# Patient Record
Sex: FEMALE | Race: WHITE | NOT HISPANIC OR LATINO | Employment: STUDENT | URBAN - METROPOLITAN AREA
[De-identification: names, ages, dates, MRNs, and addresses within clinical notes are randomized per-mention and may not be internally consistent; named-entity substitution may affect disease eponyms.]

---

## 2020-07-16 ENCOUNTER — TELEPHONE (OUTPATIENT)
Dept: PLASTIC SURGERY | Facility: CLINIC | Age: 20
End: 2020-07-16

## 2020-07-22 ENCOUNTER — TELEPHONE (OUTPATIENT)
Dept: PLASTIC SURGERY | Facility: CLINIC | Age: 20
End: 2020-07-22

## 2020-07-27 ENCOUNTER — TELEPHONE (OUTPATIENT)
Dept: PLASTIC SURGERY | Facility: CLINIC | Age: 20
End: 2020-07-27

## 2020-07-27 DIAGNOSIS — F64.0 GENDER DYSPHORIA IN ADOLESCENT AND ADULT: Primary | ICD-10-CM

## 2020-07-27 NOTE — TELEPHONE ENCOUNTER
[facility]:  Care Coordination Note     SITUATION   Ellis Hull is a 20 year old adult who is receiving support for:  Clinic Care Coordination - Initial  .    BACKGROUND     Pt is seeking top surgery    Pt currently works with [employer] on the [place] campus, however has not seen them since COVID    Pt has been on HRT since 2019  Pt is a non smoker and not diabetic    Pt was give Fax number for [facility] to get LOS to     ASSESSMENT     Surgery              CGC Assessment  Comprehensive [facility] Care ([facility]) Enrollment: Enrolled  Patient has a therapist: Yes  Name of therapist: Josias Cruz  Letter of support #1: Requested  Surgery being considered: Yes  Mastectomy: Yes  Mammogram completed: No          PLAN         Follow-up plan:  Pt to get the [facility] LOS    Pt to attend scheduled consult       River Marie

## 2020-11-27 ENCOUNTER — MEDICAL CORRESPONDENCE (OUTPATIENT)
Dept: HEALTH INFORMATION MANAGEMENT | Facility: CLINIC | Age: 20
End: 2020-11-27

## 2020-12-02 ENCOUNTER — TELEPHONE (OUTPATIENT)
Dept: PLASTIC SURGERY | Facility: CLINIC | Age: 20
End: 2020-12-02

## 2020-12-02 NOTE — TELEPHONE ENCOUNTER
12/2/2020 1:37 PM    Called patient at the contact number listed on file; no answer. Left voicemail. Provided contact info for patient to call back in order to convert 12/11 appointment to virtual. Sent MyC w/ chacha.    Phani Cuba NREMT

## 2020-12-06 ENCOUNTER — HEALTH MAINTENANCE LETTER (OUTPATIENT)
Age: 20
End: 2020-12-06

## 2020-12-10 ENCOUNTER — PATIENT OUTREACH (OUTPATIENT)
Dept: PLASTIC SURGERY | Facility: CLINIC | Age: 20
End: 2020-12-10

## 2020-12-10 NOTE — PROGRESS NOTES
[facility]:  Care Coordination Note     SITUATION   Patient (Ellis, he/him or they/them)  is a 20 year old who is receiving support for:  Clinic Care Coordination - Follow-up (Recieved Letter for top surgery)  .    BACKGROUND     Received adequate letter for case request and PA.     Pt has top consult with Dr. Hines on 12/11/20.     ASSESSMENT     Surgery              CGC Assessment  Comprehensive Gender Care ([facility]) Enrollment: Enrolled  Patient has a therapist: Yes  Name of therapist: Tavon Austin  Letter of support #1: Received  Letter #1 Date: 11/27/20          PLAN          Nursing Interventions:   Reviewed letter of support for WPATH standards of care which is adequate.     Follow-up plan: Pt to attend consult with Dr. Hines and complete other necessary treatment steps.        Darron Nunes

## 2020-12-11 ENCOUNTER — VIRTUAL VISIT (OUTPATIENT)
Dept: PLASTIC SURGERY | Facility: CLINIC | Age: 20
End: 2020-12-11
Attending: PLASTIC SURGERY
Payer: COMMERCIAL

## 2020-12-11 VITALS — WEIGHT: 240 LBS | HEIGHT: 64 IN | BODY MASS INDEX: 40.97 KG/M2

## 2020-12-11 DIAGNOSIS — F64.0 GENDER DYSPHORIA IN ADOLESCENT AND ADULT: Primary | ICD-10-CM

## 2020-12-11 PROBLEM — Z97.3 WEARS GLASSES: Status: ACTIVE | Noted: 2017-07-17

## 2020-12-11 PROBLEM — F60.3 BORDERLINE PERSONALITY DISORDER (H): Status: ACTIVE | Noted: 2020-12-11

## 2020-12-11 PROBLEM — F43.10 PTSD (POST-TRAUMATIC STRESS DISORDER): Status: ACTIVE | Noted: 2020-12-11

## 2020-12-11 PROBLEM — J45.31 MILD PERSISTENT ASTHMA WITH EXACERBATION: Status: ACTIVE | Noted: 2020-12-11

## 2020-12-11 PROBLEM — F32.1 MODERATE MAJOR DEPRESSION (H): Status: ACTIVE | Noted: 2020-12-11

## 2020-12-11 PROCEDURE — 99203 OFFICE O/P NEW LOW 30 MIN: CPT | Mod: 95 | Performed by: PLASTIC SURGERY

## 2020-12-11 RX ORDER — LORAZEPAM 0.5 MG/1
0.5 TABLET ORAL DAILY PRN
COMMUNITY

## 2020-12-11 RX ORDER — ALBUTEROL SULFATE 1.25 MG/3ML
1.25 SOLUTION RESPIRATORY (INHALATION)
COMMUNITY

## 2020-12-11 RX ORDER — FINASTERIDE 1 MG/1
TABLET, FILM COATED ORAL
COMMUNITY

## 2020-12-11 RX ORDER — LEVOTHYROXINE SODIUM 50 UG/1
50 TABLET ORAL
COMMUNITY
Start: 2019-06-14

## 2020-12-11 RX ORDER — LISDEXAMFETAMINE DIMESYLATE 50 MG/1
50 CAPSULE ORAL
COMMUNITY
Start: 2019-05-31

## 2020-12-11 RX ORDER — BUSPIRONE HYDROCHLORIDE 15 MG/1
TABLET ORAL
COMMUNITY
End: 2021-02-12

## 2020-12-11 ASSESSMENT — MIFFLIN-ST. JEOR: SCORE: 1833.63

## 2020-12-11 ASSESSMENT — PAIN SCALES - GENERAL: PAINLEVEL: NO PAIN (0)

## 2020-12-11 NOTE — NURSING NOTE
"Chief Complaint   Patient presents with     Consult     new top (mast); he/him or they/them       Vitals:    12/11/20 0957   Weight: 108.9 kg (240 lb)   Height: 1.626 m (5' 4\")       Body mass index is 41.2 kg/m .    Phani Cuba, EMT    "

## 2020-12-11 NOTE — PROGRESS NOTES
"Cyndee Hull is a 20 year old adult who is being evaluated via a billable video visit.      The patient has been notified of following:     \"This video visit will be conducted via a call between you and your physician/provider. We have found that certain health care needs can be provided without the need for an in-person physical exam.  This service lets us provide the care you need with a video conversation.  If a prescription is necessary we can send it directly to your pharmacy.  If lab work is needed we can place an order for that and you can then stop by our lab to have the test done at a later time.    Video visits are billed at different rates depending on your insurance coverage.  Please reach out to your insurance provider with any questions.    If during the course of the call the physician/provider feels a video visit is not appropriate, you will not be charged for this service.\"    Patient has given verbal consent for Video visit? Yes  How would you like to obtain your AVS? MyChart  If you are dropped from the video visit, the video invite should be resent to: Text to cell phone: [phone]  Will anyone else be joining your video visit? No        Video-Visit Details    Type of service:  Video Visit    Video Start Time: 1010a  Video End Time: 1026a    Originating Location (pt. Location): Home    Distant Location (provider location):  [facility] PLASTIC AND RECONSTRUCTIVE SURGERY CLINIC [place]     Platform used for Video Visit: [facility]    PLASTIC SURGERY HISTORY AND PHYSICAL    Chief Complaint: Gender dysphoria, requesting top surgery.     HPI: Patient is a 20 year old trans-person who prefers he/him/they/them pronouns, requesting top surgery.  Patient has been considering undergoing top surgery for the past 2.5 years.  The patient has history of chest binding occasionally for the past 2.5 years.  By undergoing top surgery, the patient would like to achieve congruence between the  physical " "body with chosen gender identity.  The patient transitioned 2 years ago.  Reports supportive family and friends.  Chosen name is Nay.  The patient has been on testosterone therapy for the past 2 years.  Denies any previous breast history. Denies tobacco smoking. Grandfather may have had bleeding disorder.     PMH:   Past Medical History:   Diagnosis Date     Epistaxis, hereditary        PSH:   No past surgical history on file.    FH:   No family history on file.     SH:   Social History     Tobacco Use     Smoking status: Never Smoker     Smokeless tobacco: Never Used   Substance Use Topics     Alcohol use: None     Drug use: None      Lives in [place], [place]. Moved there for [facility]. Living with 5 roommates and 5 cats. Grew up in [place].    MEDS:     Current Outpatient Medications:      albuterol (ACCUNEB) 1.25 MG/3ML neb solution, Inhale 1.25 mg into the lungs, Disp: , Rfl:      busPIRone (BUSPAR) 15 MG tablet, , Disp: , Rfl:      ferrous fumarate 65 mg, [place]. FE,-Vitamin C 125 mg (VITRON C)  MG TABS tablet, Take 1 tablet by mouth daily, Disp: , Rfl:      finasteride (PROPECIA) 1 MG tablet, , Disp: , Rfl:      levothyroxine (SYNTHROID/LEVOTHROID) 50 MCG tablet, Take 50 mcg by mouth, Disp: , Rfl:      lisdexamfetamine (VYVANSE) 50 MG capsule, Take 50 mg by mouth, Disp: , Rfl:      LORazepam (ATIVAN) 0.5 MG tablet, Take 0.5 mg by mouth daily as needed, Disp: , Rfl:      Multiple Vitamins-Minerals (MULTIVITAL PO), , Disp: , Rfl:      TESTOSTERONE CYPIONATE IM, Inject 40 mg into the muscle, Disp: , Rfl:      vortioxetine (TRINTELLIX) 20 MG tablet, Take 20 mg by mouth, Disp: , Rfl:        ALLERGIES:     Allergies   Allergen Reactions     Azithromycin Rash     Cefdinir Rash     Cefprozil Nausea and GI Disturbance        FH: None.     ROS: Negative except for HPI     PHYSICAL EXAMINATION:   Ht 1.626 m (5' 4\")   Wt 108.9 kg (240 lb)   BMI 41.20 kg/m     BMI: Body mass index is 41.2 kg/m .  Deferred " given virtual visit.     ASSESSMENT: Gender dysphoria, requesting top surgery.     PLAN: The patient is a potential candidate for bilateral simple complete mastectomy with free nipple graft reconstruction as a form of chest gender confirmation surgery. Patient has  provided us with a letter of support.  We will review this.  I am requesting a baseline screening mammogram.    I explained this outpatient procedure in detail today.  I explained the risks to include bleeding, infection, injury to surrounding structures, fluid collection, nipple or nipple graft loss, nipple sensory loss, change in nipple size, wound healing difficulties, contour deformity, dog ears, asymmetry, and need for revision surgery.  Patient accepts these risks and wishes to proceed with surgery.       Total time spent with patient was 30 min of which greater than 50% was in counseling.    Christie Hines MD  Plastic & Reconstructive Surgery  Pager: [phone]

## 2020-12-11 NOTE — LETTER
"12/11/2020       RE: Cyndee Hull  [address] [place] [address]     Dear Colleague,    Thank you for referring your patient, Cyndee Hull, to the [facility] PLASTIC AND RECONSTRUCTIVE SURGERY CLINIC [place] at [facility]. Please see a copy of my visit note below.    Cyndee Hull is a 20 year old adult who is being evaluated via a billable video visit.      The patient has been notified of following:     \"This video visit will be conducted via a call between you and your physician/provider. We have found that certain health care needs can be provided without the need for an in-person physical exam.  This service lets us provide the care you need with a video conversation.  If a prescription is necessary we can send it directly to your pharmacy.  If lab work is needed we can place an order for that and you can then stop by our lab to have the test done at a later time.    Video visits are billed at different rates depending on your insurance coverage.  Please reach out to your insurance provider with any questions.    If during the course of the call the physician/provider feels a video visit is not appropriate, you will not be charged for this service.\"    Patient has given verbal consent for Video visit? Yes  How would you like to obtain your AVS? MyChart  If you are dropped from the video visit, the video invite should be resent to: Text to cell phone: [phone]  Will anyone else be joining your video visit? No      Video-Visit Details    Type of service:  Video Visit    Video Start Time: 1010a  Video End Time: 1026a    Originating Location (pt. Location): Home    Distant Location (provider location):  [facility] PLASTIC AND RECON[facility]IVE SURGERY CLINIC [place]     Platform used for Video Visit: [employer]    PLASTIC SURGERY HISTORY AND PHYSICAL    Chief Complaint: Gender dysphoria, requesting top surgery.     HPI: Patient is a 20 year old " trans-person who prefers he/him/they/them pronouns, requesting top surgery.  Patient has been considering undergoing top surgery for the past 2.5 years.  The patient has history of chest binding occasionally for the past 2.5 years.  By undergoing top surgery, the patient would like to achieve congruence between the  physical body with chosen gender identity.  The patient transitioned 2 years ago.  Reports supportive family and friends.  Chosen name is Nay.  The patient has been on testosterone therapy for the past 2 years.  Denies any previous breast history. Denies tobacco smoking. Grandfather may have had bleeding disorder.     PMH:   Past Medical History:   Diagnosis Date     Epistaxis, hereditary        PSH:   No past surgical history on file.    FH:   No family history on file.     SH:   Social History     Tobacco Use     Smoking status: Never Smoker     Smokeless tobacco: Never Used   Substance Use Topics     Alcohol use: None     Drug use: None      Lives in [place], [place]. [facility]. Living with 5 roommates and 5 cats. Grew up in [place].    MEDS:     Current Outpatient Medications:      albuterol (ACCUNEB) 1.25 MG/3ML neb solution, Inhale 1.25 mg into the lungs, Disp: , Rfl:      busPIRone (BUSPAR) 15 MG tablet, , Disp: , Rfl:      ferrous fumarate 65 mg, [place]. FE,-Vitamin C 125 mg (VITRON C)  MG TABS tablet, Take 1 tablet by mouth daily, Disp: , Rfl:      finasteride (PROPECIA) 1 MG tablet, , Disp: , Rfl:      levothyroxine (SYNTHROID/LEVOTHROID) 50 MCG tablet, Take 50 mcg by mouth, Disp: , Rfl:      lisdexamfetamine (VYVANSE) 50 MG capsule, Take 50 mg by mouth, Disp: , Rfl:      LORazepam (ATIVAN) 0.5 MG tablet, Take 0.5 mg by mouth daily as needed, Disp: , Rfl:      Multiple Vitamins-Minerals (MULTIVITAL PO), , Disp: , Rfl:      TESTOSTERONE CYPIONATE IM, Inject 40 mg into the muscle, Disp: , Rfl:      vortioxetine (TRINTELLIX) 20 MG tablet, Take 20 mg by mouth, Disp: ,  "Rfl:        ALLERGIES:     Allergies   Allergen Reactions     Azithromycin Rash     Cefdinir Rash     Cefprozil Nausea and GI Disturbance        FH: None.     ROS: Negative except for HPI     PHYSICAL EXAMINATION:   Ht 1.626 m (5' 4\")   Wt 108.9 kg (240 lb)   BMI 41.20 kg/m     BMI: Body mass index is 41.2 kg/m .  Deferred given virtual visit.     ASSESSMENT: Gender dysphoria, requesting top surgery.     PLAN: The patient is a potential candidate for bilateral simple complete mastectomy with free nipple graft reconstruction as a form of chest gender confirmation surgery. Patient has  provided us with a letter of support.  We will review this.  I am requesting a baseline screening mammogram.    I explained this outpatient procedure in detail today.  I explained the risks to include bleeding, infection, injury to surrounding structures, fluid collection, nipple or nipple graft loss, nipple sensory loss, change in nipple size, wound healing difficulties, contour deformity, dog ears, asymmetry, and need for revision surgery.  Patient accepts these risks and wishes to proceed with surgery.       Total time spent with patient was 30 min of which greater than 50% was in counseling.    Christie Hines MD  Plastic & Reconstructive Surgery  Pager: [phone]        "

## 2020-12-21 ENCOUNTER — TELEPHONE (OUTPATIENT)
Dept: SURGERY | Facility: CLINIC | Age: 20
End: 2020-12-21

## 2020-12-28 ENCOUNTER — TRANSFERRED RECORDS (OUTPATIENT)
Dept: HEALTH INFORMATION MANAGEMENT | Facility: CLINIC | Age: 20
End: 2020-12-28

## 2020-12-28 LAB — NEGATIVE: NORMAL

## 2021-01-04 ENCOUNTER — PREP FOR PROCEDURE (OUTPATIENT)
Dept: PLASTIC SURGERY | Facility: CLINIC | Age: 21
End: 2021-01-04

## 2021-01-04 ENCOUNTER — TELEPHONE (OUTPATIENT)
Dept: SURGERY | Facility: CLINIC | Age: 21
End: 2021-01-04

## 2021-01-04 ENCOUNTER — PATIENT OUTREACH (OUTPATIENT)
Dept: PLASTIC SURGERY | Facility: CLINIC | Age: 21
End: 2021-01-04

## 2021-01-04 DIAGNOSIS — F64.0 GENDER DYSPHORIA IN ADOLESCENT AND ADULT: Primary | ICD-10-CM

## 2021-01-04 RX ORDER — CLINDAMYCIN PHOSPHATE 900 MG/50ML
900 INJECTION, SOLUTION INTRAVENOUS SEE ADMIN INSTRUCTIONS
Status: CANCELLED | OUTPATIENT
Start: 2021-01-04

## 2021-01-04 RX ORDER — CLINDAMYCIN PHOSPHATE 900 MG/50ML
900 INJECTION, SOLUTION INTRAVENOUS
Status: CANCELLED | OUTPATIENT
Start: 2021-01-04

## 2021-01-04 NOTE — TELEPHONE ENCOUNTER
received [facility] PA approval for bilateral mastectomy, #[id number], date span 12/21/20-6/18/21

## 2021-01-04 NOTE — PROGRESS NOTES
[facility]:  Care Coordination Note     SITUATION   Ellis Hull is a 20 year old adult who is receiving support for:  No chief complaint on file.  .    BACKGROUND     Pt attended new [facility] consult with Dr. Hines.    ASSESSMENT     Surgery              CGC Assessment  Comprehensive Gender Care ([facility]) Enrollment: Enrolled  Patient has a therapist: Yes  Name of therapist: Tavon Austin  Letter of support #1: Received  Letter #1 Date: 11/27/20  Surgery being considered: Yes  Mastectomy: Yes  Mammogram completed: Yes(12/28/20 Negative)          PLAN          Nursing Interventions: Mammogram completed 12/28/20 negative. LOS previously received and reviewed, adequate for PA.    Follow-up plan:  Ready to PA.       Carri Dooley RN

## 2021-01-05 PROBLEM — F64.0 GENDER DYSPHORIA IN ADOLESCENT AND ADULT: Status: ACTIVE | Noted: 2021-01-05

## 2021-02-01 DIAGNOSIS — Z11.59 ENCOUNTER FOR SCREENING FOR OTHER VIRAL DISEASES: Primary | ICD-10-CM

## 2021-02-12 ENCOUNTER — OFFICE VISIT (OUTPATIENT)
Dept: PLASTIC SURGERY | Facility: CLINIC | Age: 21
End: 2021-02-12
Payer: COMMERCIAL

## 2021-02-12 VITALS
BODY MASS INDEX: 41.32 KG/M2 | OXYGEN SATURATION: 96 % | HEIGHT: 64 IN | HEART RATE: 119 BPM | SYSTOLIC BLOOD PRESSURE: 151 MMHG | DIASTOLIC BLOOD PRESSURE: 102 MMHG | WEIGHT: 242 LBS

## 2021-02-12 DIAGNOSIS — F64.0 GENDER DYSPHORIA IN ADOLESCENT AND ADULT: Primary | ICD-10-CM

## 2021-02-12 DIAGNOSIS — Z11.59 ENCOUNTER FOR SCREENING FOR OTHER VIRAL DISEASES: ICD-10-CM

## 2021-02-12 LAB
LABORATORY COMMENT REPORT: NORMAL
SARS-COV-2 RNA RESP QL NAA+PROBE: NEGATIVE
SARS-COV-2 RNA RESP QL NAA+PROBE: NORMAL
SPECIMEN SOURCE: NORMAL
SPECIMEN SOURCE: NORMAL

## 2021-02-12 PROCEDURE — U0003 INFECTIOUS AGENT DETECTION BY NUCLEIC ACID (DNA OR RNA); SEVERE ACUTE RESPIRATORY SYNDROME CORONAVIRUS 2 (SARS-COV-2) (CORONAVIRUS DISEASE [COVID-19]), AMPLIFIED PROBE TECHNIQUE, MAKING USE OF HIGH THROUGHPUT TECHNOLOGIES AS DESCRIBED BY CMS-2020-01-R: HCPCS | Performed by: PATHOLOGY

## 2021-02-12 PROCEDURE — 99213 OFFICE O/P EST LOW 20 MIN: CPT | Performed by: PLASTIC SURGERY

## 2021-02-12 PROCEDURE — U0005 INFEC AGEN DETEC AMPLI PROBE: HCPCS | Performed by: PATHOLOGY

## 2021-02-12 RX ORDER — SYRINGE, DISPOSABLE, 1 ML
SYRINGE, EMPTY DISPOSABLE MISCELLANEOUS
COMMUNITY
Start: 2021-02-05

## 2021-02-12 RX ORDER — NEEDLES, DISPOSABLE 25GX5/8"
NEEDLE, DISPOSABLE MISCELLANEOUS
COMMUNITY
Start: 2021-02-05

## 2021-02-12 ASSESSMENT — MIFFLIN-ST. JEOR: SCORE: 1842.7

## 2021-02-12 ASSESSMENT — PAIN SCALES - GENERAL: PAINLEVEL: NO PAIN (0)

## 2021-02-12 NOTE — PROGRESS NOTES
"PLASTIC SURGERY OUTPATIENT NOTE     SUBJECTIVE  Anxious about COVID test. Also has some difficulty with mom accepting his gender identity and posting his surgical plans in public forum.    PHYSICAL EXAM  BP (!) 151/102 (BP Location: Left arm, Patient Position: Chair, Cuff Size: Adult Regular)   Pulse 119   Ht 1.626 m (5' 4\")   Wt 109.8 kg (242 lb)   SpO2 96%   BMI 41.54 kg/m     BMI: Body mass index is 41.54 kg/m .  General: No acute distress.    Chest examination was performed in the presence of a chaperone.    General chest shape: Flat    Pectoralis muscles intact bilaterally.    Bilateral lateral thoracic rolls are none.    Notch to nipple distance is 29 cm on the RIGHT and 29 cm on the LEFT.    Nipple to fold distance is 9 cm on the RIGHT and 9 cm on the LEFT.   Areole or diameter is 8.5 cm on the RIGHT and 8.5 cm on the LEFT.   Base width is 16 cm on the RIGHT and 16 cm on the LEFT.   Manubrium to current IMF distance: 20 cm  IMF to posterior iliac crest distance: 25 cm    ASSESSMENT/PLAN  Cyndee Hull is a 20 year old transgender gentleman with gender dysphoria currently presenting for preoperative evaluation in anticipation of bilateral mastectomy with free nipple grafts.  I explained this outpatient procedure in detail today.  I explained the risks to include bleeding, infection, injury to surrounding structures, fluid collection, nipple or nipple graft loss, nipple sensory loss, change in nipple size, wound healing difficulties, contour deformity, dog ears, asymmetry, and need for revision surgery.  Patient accepts these risks and wishes to proceed with surgery. Surgery is scheduled for 2/16/21.    Patient does not want nipples.    Total time spent with for this visit was 20 minutes, including review of documentation, counseling/discussion with patient, documentation, and organizing any potential follow-up.    Christie Hines MD  Pediatric Cleft and Craniofacial Surgery  Division of Plastic " Surgery  Pager: [phone]

## 2021-02-12 NOTE — LETTER
"2/12/2021       RE: Cyndee Hull  [address] [place] [address]     Dear Colleague,    Thank you for referring your patient, Cyndee Hull, to the [facility] PLASTIC AND RECONSTRUCTIVE SURGERY CLINIC [place] at [facility]. Please see a copy of my visit note below.    PLASTIC SURGERY OUTPATIENT NOTE     SUBJECTIVE  Anxious about COVID test. Also has some difficulty with mom accepting his gender identity and posting his surgical plans in public forum.    PHYSICAL EXAM  BP (!) 151/102 (BP Location: Left arm, Patient Position: Chair, Cuff Size: Adult Regular)   Pulse 119   Ht 1.626 m (5' 4\")   Wt 109.8 kg (242 lb)   SpO2 96%   BMI 41.54 kg/m     BMI: Body mass index is 41.54 kg/m .  General: No acute distress.    Chest examination was performed in the presence of a chaperone.    General chest shape: Flat    Pectoralis muscles intact bilaterally.    Bilateral lateral thoracic rolls are none.    Notch to nipple distance is 29 cm on the RIGHT and 29 cm on the LEFT.    Nipple to fold distance is 9 cm on the RIGHT and 9 cm on the LEFT.   Areole or diameter is 8.5 cm on the RIGHT and 8.5 cm on the LEFT.   Base width is 16 cm on the RIGHT and 16 cm on the LEFT.   Manubrium to current IMF distance: 20 cm  IMF to posterior iliac crest distance: 25 cm    ASSESSMENT/PLAN  Cyndee Hull is a 20 year old transgender gentleman with gender dysphoria currently presenting for preoperative evaluation in anticipation of bilateral mastectomy with free nipple grafts.  I explained this outpatient procedure in detail today.  I explained the risks to include bleeding, infection, injury to surrounding structures, fluid collection, nipple or nipple graft loss, nipple sensory loss, change in nipple size, wound healing difficulties, contour deformity, dog ears, asymmetry, and need for revision surgery.  Patient accepts these risks and wishes to proceed with surgery. " Surgery is scheduled for 2/16/21.    Patient does not want nipples.    Total time spent with for this visit was 20 minutes, including review of documentation, counseling/discussion with patient, documentation, and organizing any potential follow-up.    Christie Hines MD  Pediatric Cleft and Craniofacial Surgery  Division of Plastic Surgery  Pager: [phone]

## 2021-02-12 NOTE — NURSING NOTE
"Chief Complaint   Patient presents with     RECHECK     pre op anna mast w/ nips DOS 2/16       Vitals:    02/12/21 1238   BP: (!) 151/102   BP Location: Left arm   Patient Position: Chair   Cuff Size: Adult Regular   Pulse: 119   SpO2: 96%   Weight: 109.8 kg (242 lb)   Height: 1.626 m (5' 4\")       Body mass index is 41.54 kg/m .    Phani Cuba, EMT    "

## 2021-02-15 ENCOUNTER — ANESTHESIA EVENT (OUTPATIENT)
Dept: SURGERY | Facility: AMBULATORY SURGERY CENTER | Age: 21
End: 2021-02-15

## 2021-02-15 RX ORDER — FENTANYL CITRATE 50 UG/ML
25-50 INJECTION, SOLUTION INTRAMUSCULAR; INTRAVENOUS
Status: CANCELLED | OUTPATIENT
Start: 2021-02-15

## 2021-02-15 RX ORDER — NALOXONE HYDROCHLORIDE 0.4 MG/ML
0.4 INJECTION, SOLUTION INTRAMUSCULAR; INTRAVENOUS; SUBCUTANEOUS
Status: DISCONTINUED | OUTPATIENT
Start: 2021-02-15 | End: 2021-02-17 | Stop reason: HOSPADM

## 2021-02-15 RX ORDER — NALOXONE HYDROCHLORIDE 0.4 MG/ML
0.2 INJECTION, SOLUTION INTRAMUSCULAR; INTRAVENOUS; SUBCUTANEOUS
Status: DISCONTINUED | OUTPATIENT
Start: 2021-02-15 | End: 2021-02-17 | Stop reason: HOSPADM

## 2021-02-16 ENCOUNTER — HOSPITAL ENCOUNTER (OUTPATIENT)
Facility: AMBULATORY SURGERY CENTER | Age: 21
Discharge: HOME | End: 2021-02-16
Attending: PLASTIC SURGERY | Admitting: PLASTIC SURGERY
Payer: COMMERCIAL

## 2021-02-16 ENCOUNTER — ANESTHESIA (OUTPATIENT)
Dept: SURGERY | Facility: AMBULATORY SURGERY CENTER | Age: 21
End: 2021-02-16

## 2021-02-16 VITALS
HEIGHT: 65 IN | SYSTOLIC BLOOD PRESSURE: 122 MMHG | BODY MASS INDEX: 40.32 KG/M2 | TEMPERATURE: 97.9 F | RESPIRATION RATE: 16 BRPM | DIASTOLIC BLOOD PRESSURE: 72 MMHG | OXYGEN SATURATION: 100 % | WEIGHT: 242 LBS | HEART RATE: 75 BPM

## 2021-02-16 DIAGNOSIS — F64.0 GENDER DYSPHORIA IN ADOLESCENT AND ADULT: Primary | ICD-10-CM

## 2021-02-16 LAB
HCG UR QL: NEGATIVE
INTERNAL QC OK POCT: YES

## 2021-02-16 PROCEDURE — 19303 MAST SIMPLE COMPLETE: CPT | Mod: 50

## 2021-02-16 PROCEDURE — 81025 URINE PREGNANCY TEST: CPT | Performed by: ANESTHESIOLOGY

## 2021-02-16 PROCEDURE — 88305 TISSUE EXAM BY PATHOLOGIST: CPT | Mod: 26 | Performed by: PATHOLOGY

## 2021-02-16 RX ORDER — ALBUTEROL SULFATE 0.83 MG/ML
2.5 SOLUTION RESPIRATORY (INHALATION) EVERY 4 HOURS PRN
Status: DISCONTINUED | OUTPATIENT
Start: 2021-02-16 | End: 2021-02-17 | Stop reason: HOSPADM

## 2021-02-16 RX ORDER — OXYCODONE HYDROCHLORIDE 5 MG/1
5-10 TABLET ORAL EVERY 4 HOURS PRN
Status: DISCONTINUED | OUTPATIENT
Start: 2021-02-16 | End: 2021-02-17 | Stop reason: HOSPADM

## 2021-02-16 RX ORDER — FENTANYL CITRATE 50 UG/ML
INJECTION, SOLUTION INTRAMUSCULAR; INTRAVENOUS PRN
Status: DISCONTINUED | OUTPATIENT
Start: 2021-02-16 | End: 2021-02-16

## 2021-02-16 RX ORDER — NALOXONE HYDROCHLORIDE 0.4 MG/ML
0.2 INJECTION, SOLUTION INTRAMUSCULAR; INTRAVENOUS; SUBCUTANEOUS
Status: DISCONTINUED | OUTPATIENT
Start: 2021-02-16 | End: 2021-02-17 | Stop reason: HOSPADM

## 2021-02-16 RX ORDER — BUPIVACAINE HYDROCHLORIDE 2.5 MG/ML
INJECTION, SOLUTION EPIDURAL; INFILTRATION; INTRACAUDAL PRN
Status: DISCONTINUED | OUTPATIENT
Start: 2021-02-16 | End: 2021-02-16

## 2021-02-16 RX ORDER — DEXAMETHASONE SODIUM PHOSPHATE 10 MG/ML
4 INJECTION, SOLUTION INTRAMUSCULAR; INTRAVENOUS EVERY 10 MIN PRN
Status: DISCONTINUED | OUTPATIENT
Start: 2021-02-16 | End: 2021-02-17 | Stop reason: HOSPADM

## 2021-02-16 RX ORDER — DEXAMETHASONE SODIUM PHOSPHATE 4 MG/ML
INJECTION, SOLUTION INTRA-ARTICULAR; INTRALESIONAL; INTRAMUSCULAR; INTRAVENOUS; SOFT TISSUE PRN
Status: DISCONTINUED | OUTPATIENT
Start: 2021-02-16 | End: 2021-02-16

## 2021-02-16 RX ORDER — CLINDAMYCIN PHOSPHATE 900 MG/50ML
900 INJECTION, SOLUTION INTRAVENOUS
Status: COMPLETED | OUTPATIENT
Start: 2021-02-16 | End: 2021-02-16

## 2021-02-16 RX ORDER — OXYCODONE HYDROCHLORIDE 5 MG/1
5 TABLET ORAL EVERY 6 HOURS PRN
Qty: 6 TABLET | Refills: 0 | Status: SHIPPED | OUTPATIENT
Start: 2021-02-16 | End: 2021-02-19

## 2021-02-16 RX ORDER — KETOROLAC TROMETHAMINE 30 MG/ML
30 INJECTION, SOLUTION INTRAMUSCULAR; INTRAVENOUS EVERY 6 HOURS PRN
Status: DISCONTINUED | OUTPATIENT
Start: 2021-02-16 | End: 2021-02-17 | Stop reason: HOSPADM

## 2021-02-16 RX ORDER — HYDROMORPHONE HYDROCHLORIDE 1 MG/ML
.3-.5 INJECTION, SOLUTION INTRAMUSCULAR; INTRAVENOUS; SUBCUTANEOUS EVERY 10 MIN PRN
Status: DISCONTINUED | OUTPATIENT
Start: 2021-02-16 | End: 2021-02-17 | Stop reason: HOSPADM

## 2021-02-16 RX ORDER — CLINDAMYCIN PHOSPHATE 900 MG/50ML
900 INJECTION, SOLUTION INTRAVENOUS SEE ADMIN INSTRUCTIONS
Status: DISCONTINUED | OUTPATIENT
Start: 2021-02-16 | End: 2021-02-17 | Stop reason: HOSPADM

## 2021-02-16 RX ORDER — SODIUM CHLORIDE, SODIUM LACTATE, POTASSIUM CHLORIDE, CALCIUM CHLORIDE 600; 310; 30; 20 MG/100ML; MG/100ML; MG/100ML; MG/100ML
INJECTION, SOLUTION INTRAVENOUS CONTINUOUS
Status: DISCONTINUED | OUTPATIENT
Start: 2021-02-16 | End: 2021-02-17 | Stop reason: HOSPADM

## 2021-02-16 RX ORDER — LABETALOL HYDROCHLORIDE 5 MG/ML
10 INJECTION, SOLUTION INTRAVENOUS
Status: DISCONTINUED | OUTPATIENT
Start: 2021-02-16 | End: 2021-02-17 | Stop reason: HOSPADM

## 2021-02-16 RX ORDER — ONDANSETRON 4 MG/1
4 TABLET, ORALLY DISINTEGRATING ORAL EVERY 30 MIN PRN
Status: DISCONTINUED | OUTPATIENT
Start: 2021-02-16 | End: 2021-02-17 | Stop reason: HOSPADM

## 2021-02-16 RX ORDER — NALOXONE HYDROCHLORIDE 0.4 MG/ML
0.4 INJECTION, SOLUTION INTRAMUSCULAR; INTRAVENOUS; SUBCUTANEOUS
Status: DISCONTINUED | OUTPATIENT
Start: 2021-02-16 | End: 2021-02-17 | Stop reason: HOSPADM

## 2021-02-16 RX ORDER — ACETAMINOPHEN 325 MG/1
975 TABLET ORAL ONCE
Status: COMPLETED | OUTPATIENT
Start: 2021-02-16 | End: 2021-02-16

## 2021-02-16 RX ORDER — FENTANYL CITRATE 50 UG/ML
25-50 INJECTION, SOLUTION INTRAMUSCULAR; INTRAVENOUS
Status: DISCONTINUED | OUTPATIENT
Start: 2021-02-16 | End: 2021-02-17 | Stop reason: HOSPADM

## 2021-02-16 RX ORDER — MEPERIDINE HYDROCHLORIDE 25 MG/ML
12.5 INJECTION INTRAMUSCULAR; INTRAVENOUS; SUBCUTANEOUS
Status: DISCONTINUED | OUTPATIENT
Start: 2021-02-16 | End: 2021-02-17 | Stop reason: HOSPADM

## 2021-02-16 RX ORDER — PROPOFOL 10 MG/ML
INJECTION, EMULSION INTRAVENOUS CONTINUOUS PRN
Status: DISCONTINUED | OUTPATIENT
Start: 2021-02-16 | End: 2021-02-16

## 2021-02-16 RX ORDER — FLUMAZENIL 0.1 MG/ML
0.2 INJECTION, SOLUTION INTRAVENOUS
Status: DISCONTINUED | OUTPATIENT
Start: 2021-02-16 | End: 2021-02-17 | Stop reason: HOSPADM

## 2021-02-16 RX ORDER — PROPOFOL 10 MG/ML
INJECTION, EMULSION INTRAVENOUS PRN
Status: DISCONTINUED | OUTPATIENT
Start: 2021-02-16 | End: 2021-02-16

## 2021-02-16 RX ORDER — KETAMINE HYDROCHLORIDE 10 MG/ML
INJECTION INTRAMUSCULAR; INTRAVENOUS PRN
Status: DISCONTINUED | OUTPATIENT
Start: 2021-02-16 | End: 2021-02-16

## 2021-02-16 RX ORDER — LIDOCAINE HYDROCHLORIDE 20 MG/ML
INJECTION, SOLUTION INFILTRATION; PERINEURAL PRN
Status: DISCONTINUED | OUTPATIENT
Start: 2021-02-16 | End: 2021-02-16

## 2021-02-16 RX ORDER — LIDOCAINE 40 MG/G
CREAM TOPICAL
Status: DISCONTINUED | OUTPATIENT
Start: 2021-02-16 | End: 2021-02-17 | Stop reason: HOSPADM

## 2021-02-16 RX ORDER — ONDANSETRON 2 MG/ML
4 INJECTION INTRAMUSCULAR; INTRAVENOUS EVERY 30 MIN PRN
Status: DISCONTINUED | OUTPATIENT
Start: 2021-02-16 | End: 2021-02-17 | Stop reason: HOSPADM

## 2021-02-16 RX ORDER — ONDANSETRON 2 MG/ML
INJECTION INTRAMUSCULAR; INTRAVENOUS PRN
Status: DISCONTINUED | OUTPATIENT
Start: 2021-02-16 | End: 2021-02-16

## 2021-02-16 RX ORDER — GABAPENTIN 300 MG/1
300 CAPSULE ORAL ONCE
Status: COMPLETED | OUTPATIENT
Start: 2021-02-16 | End: 2021-02-16

## 2021-02-16 RX ADMIN — KETAMINE HYDROCHLORIDE 50 MG: 10 INJECTION INTRAMUSCULAR; INTRAVENOUS at 08:04

## 2021-02-16 RX ADMIN — GABAPENTIN 300 MG: 300 CAPSULE ORAL at 07:29

## 2021-02-16 RX ADMIN — SODIUM CHLORIDE, SODIUM LACTATE, POTASSIUM CHLORIDE, CALCIUM CHLORIDE: 600; 310; 30; 20 INJECTION, SOLUTION INTRAVENOUS at 07:31

## 2021-02-16 RX ADMIN — PROPOFOL 200 MCG/KG/MIN: 10 INJECTION, EMULSION INTRAVENOUS at 08:08

## 2021-02-16 RX ADMIN — DEXAMETHASONE SODIUM PHOSPHATE 4 MG: 4 INJECTION, SOLUTION INTRA-ARTICULAR; INTRALESIONAL; INTRAMUSCULAR; INTRAVENOUS; SOFT TISSUE at 08:09

## 2021-02-16 RX ADMIN — PROPOFOL: 10 INJECTION, EMULSION INTRAVENOUS at 08:58

## 2021-02-16 RX ADMIN — OXYCODONE HYDROCHLORIDE 5 MG: 5 TABLET ORAL at 10:33

## 2021-02-16 RX ADMIN — ACETAMINOPHEN 975 MG: 325 TABLET ORAL at 07:29

## 2021-02-16 RX ADMIN — ONDANSETRON 4 MG: 2 INJECTION INTRAMUSCULAR; INTRAVENOUS at 09:29

## 2021-02-16 RX ADMIN — FENTANYL CITRATE 100 MCG: 50 INJECTION, SOLUTION INTRAMUSCULAR; INTRAVENOUS at 08:25

## 2021-02-16 RX ADMIN — BUPIVACAINE HYDROCHLORIDE 40 ML: 2.5 INJECTION, SOLUTION EPIDURAL; INFILTRATION; INTRACAUDAL at 07:45

## 2021-02-16 RX ADMIN — FENTANYL CITRATE 50 MCG: 50 INJECTION, SOLUTION INTRAMUSCULAR; INTRAVENOUS at 07:41

## 2021-02-16 RX ADMIN — CLINDAMYCIN PHOSPHATE 900 MG: 900 INJECTION, SOLUTION INTRAVENOUS at 08:08

## 2021-02-16 RX ADMIN — PROPOFOL 200 MG: 10 INJECTION, EMULSION INTRAVENOUS at 08:04

## 2021-02-16 RX ADMIN — LIDOCAINE HYDROCHLORIDE 100 MG: 20 INJECTION, SOLUTION INFILTRATION; PERINEURAL at 08:04

## 2021-02-16 ASSESSMENT — MIFFLIN-ST. JEOR: SCORE: 1855.4

## 2021-02-16 NOTE — OR NURSING
Patient received bilateral Pectoralis nerve block  with Exparel.  Fentanyl 50mcg and Versed 1mg given. Tolerated procedure well.     Opal Limon RN

## 2021-02-16 NOTE — ANESTHESIA PREPROCEDURE EVALUATION
Anesthesia Pre-Procedure Evaluation    Patient: Cyndee Hull   MRN: [id number] : 2000        Preoperative Diagnosis: Gender dysphoria in adolescent and adult [F64.0]   Procedure : Procedure(s):  Bilateral mastectomy     Past Medical History:   Diagnosis Date     Epistaxis, hereditary      Uncomplicated asthma       History reviewed. No pertinent surgical history.   Allergies   Allergen Reactions     Azithromycin Rash     Cefdinir Rash     Cefprozil Nausea and GI Disturbance      Social History     Tobacco Use     Smoking status: Never Smoker     Smokeless tobacco: Never Used   Substance Use Topics     Alcohol use: Not on file      Wt Readings from Last 1 Encounters:   21 109.8 kg (242 lb)        Anesthesia Evaluation   Pt has had prior anesthetic.     No history of anesthetic complications       ROS/MED HX  ENT/Pulmonary: Comment: Epistaxis    (+) Intermittent, asthma     Neurologic:       Cardiovascular:  - neg cardiovascular ROS     METS/Exercise Tolerance:     Hematologic:  - neg hematologic  ROS     Musculoskeletal:  - neg musculoskeletal ROS     GI/Hepatic:  - neg GI/hepatic ROS  (-) GERD   Renal/Genitourinary:  - neg Renal ROS     Endo:     (+) thyroid problem, Obesity,     Psychiatric/Substance Use:     (+) psychiatric history anxiety (PTSD) Recreational drug usage: Cannabis.    Infectious Disease:  - neg infectious disease ROS     Malignancy:       Other:            Physical Exam    Airway  airway exam normal      Mallampati: I   TM distance: > 3 FB   Neck ROM: full   Mouth opening: > 3 cm    Respiratory Devices and Support         Dental  no notable dental history         Cardiovascular   cardiovascular exam normal          Pulmonary   pulmonary exam normal                OUTSIDE LABS:  CBC: No results found for: WBC, HGB, HCT, PLT  BMP: No results found for: NA, POTASSIUM, CHLORIDE, CO2, BUN, CR, GLC  COAGS: No results found for: PTT, INR, FIBR  POC:   Lab Results   Component Value Date     HCG Negative 02/16/2021     HEPATIC: No results found for: ALBUMIN, PROTTOTAL, ALT, AST, GGT, ALKPHOS, BILITOTAL, BILIDIRECT, LEONORA  OTHER: No results found for: PH, LACT, A1C, ALANA, PHOS, MAG, LIPASE, AMYLASE, TSH, T4, T3, CRP, SED    Anesthesia Plan    ASA Status:  3   NPO Status:  NPO Appropriate    Anesthesia Type: General.     - Airway: LMA   Induction: Intravenous, Propofol.   Maintenance: Balanced.        Consents    Anesthesia Plan(s) and associated risks, benefits, and realistic alternatives discussed. Questions answered and patient/representative(s) expressed understanding.     - Discussed with:  Patient      - Extended Intubation/Ventilatory Support Discussed: no Extended Intubation.      - Patient is DNR/DNI Status: No    Use of blood products discussed: No .     Postoperative Care    Pain management: Peripheral nerve block (Single Shot), IV analgesics, Oral pain medications.   PONV prophylaxis: Ondansetron (or other 5HT-3), Dexamethasone or Solumedrol, Background Propofol Infusion     Comments:    Bilateral pectoralis block with Exaprel            Amrit Monge MD

## 2021-02-16 NOTE — ANESTHESIA CARE TRANSFER NOTE
Patient: Cyndee Hull    Procedure(s):  Bilateral mastectomy    Diagnosis: Gender dysphoria in adolescent and adult [F64.0]  Diagnosis Additional Information: No value filed.    Anesthesia Type:   No value filed.     Note:    Oropharynx: oropharynx clear of all foreign objects  Level of Consciousness: awake  Oxygen Supplementation: face mask    Independent Airway: airway patency satisfactory and stable  Dentition: dentition unchanged      Patient transferred to: PACU    Handoff Report: Identifed the Patient, Identified the Reponsible Provider, Reviewed the pertinent medical history, Discussed the surgical course, Reviewed Intra-OP anesthesia mangement and issues during anesthesia, Set expectations for post-procedure period and Allowed opportunity for questions and acknowledgement of understanding      Vitals: (Last set prior to Anesthesia Care Transfer)  CRNA VITALS  2/16/2021 0923 - 2/16/2021 1002      2/16/2021             Pulse:  95    SpO2:  93 %    Resp Rate (set):  10        Electronically Signed By: EFRA Rivera CRNA  February 16, 2021  10:02 AM

## 2021-02-16 NOTE — OP NOTE
PLASTIC SURGERY OPERATIVE NOTE    DATE OF OPERATION: February 16, 2021    PREOPERATIVE DIAGNOSIS: Gender dysphoria.    POSTOPERATIVE DIAGNOSIS: Gender dysphoria.    PROCEDURES PERFORMED:   1.  Bilateral simple complete mastectomy.    SURGEON: Christie Hines MD    ASSISTANT: Teddy Plascencia MD, PGY-5    ANESTHESIA: General.    ESTIMATED BLOOD LOSS: 50 mL.    COMPLICATIONS: None.    SPECIMENS: Bilateral breast tissue. Right breast tissue weight 817 g. Left breast tissue weight 640 g.    DRAINS: Two 15 Macedonian drains, one in each chest.    IMPLANTS: None.    INDICATIONS: Patient is a 20 year old transgender gentleman who prefers he/him/they/them pronouns. The patient was evaluated in the plastic surgery clinic for potential top surgery to address gender dysphoria. Prior to authorization for surgery, the patient received a letter of support. Patient transitioned 2 years ago. A screening mammogram was obtained which demonstrated no evidence of breast malignancy. Patient met WPATH guidelines for top surgery. Risks were discussed regarding bilateral mastectomy with free nipple graft reconstruction as a form of chest gender confirmation surgery. Patient accepted the associated risks and wished to proceed with surgery.    DESCRIPTION OF PROCEDURE: The patient presented to the preoperative holding area on February 16, 2021. The patient was seen by myself and anesthesia and the identification, site, and procedure were confirmed per hospital protocol. Informed consent was obtained. The chest was then marked. Patient was then taken to the operating room and placed in a supine position. Preoperative antibiotics were given, bilateral lower leg SCD's were placed, and general anesthesia was obtained. All pressure points were padded and arms placed on arm boards. The chest was then prepped with Chloraprep solution and draped in a sterile fashion. A time out was performed.    We started on the right side. Tumescent solution was infiltrated into  the subcutaneous plane for vasoconstriction.  The proposed inframammary incisions were made with a scalpel and carried down into the subcutaneous tissue with bovie cautery. Pectoralis fascia was identified. A supra-fascial dissection commenced. Then through the superior incision, a supra-parenchymal plane was developed. Dissection was carried medially to the sternum, superiorly to the clavicle, laterally to the axilla and lateral border of pectoralis, and inferiorly to the inframammary fold.     The breast tissue was then excised and weighed. The pocket was washed and hemostasis was achieved. The incision was closed in layers in such a fashion as to minimize medial and lateral dog-ears. This was completed on the contralateral side. Prior to definitive closure, a 15 Russian round channel drains was introduced into each wound and exited laterally close to the axillary hair. These drains were secured with 3-0 Nylon stitch. Contour was found to be flat and symmetric.     ABD pad was applied to the drain site, followed by fluffly kerlix wrap, and completed with a light ACE compression wrap. Patient was then extubated, awakened, and transferred to the postoperative area in stable condition. All sponge, needle, and instrument counts were accurate at the end of the case. I was present and scrubbed for the entire procedure.

## 2021-02-16 NOTE — ANESTHESIA PROCEDURE NOTES
Pre-Procedure   Staff -   Anesthesiologist:  Armit Monge MD  Resident/Fellow: Maykel Shaffer MD  Performed By: anesthesiologist and with fellow  Location: pre-op  Pre-Anesthestic Checklist: patient identified, IV checked, site marked, risks and benefits discussed, informed consent, monitors and equipment checked, pre-op evaluation, at physician/surgeon's request and post-op pain management  Timeout:  Correct Patient: Yes   Correct Procedure: Yes   Correct Site: Yes   Correct Position: Yes   Correct Laterality: Yes   Site Marked: Yes    Procedure Documentation  Procedure: Pectoralis  Diagnosis: POST OPERATIVE PAIN  Laterality: bilateral  Patient Position:supine  Patient Prep/Sterile Barriers: sterile gloves, mask, Chloraprep  Needle type: short bevel  Needle Gauge: 21.   Needle Length (millimeters) 110   Ultrasound guided, Ultrasound used to identify targeted nerve, plexus, vascular marker, or fascial plane and place a needle adjacent to it in real-time, Ultrasound was used to visualize the spread of anesthetic in close proximity to the above referenced structure  A permanent image is entered into the patient's record.  There were no apparent abnormal pathologic findings    Assessment/Narrative      The placement was negative for: blood aspirated, painful injection and site bleeding  Paresthesias: No.  Bolus given via needle..   Secured via.   Insertion/Infusion Method: Single Shot  Complications: none  Injection made incrementally with aspirations every 5 mL.  Comments:  266 mg Exparel used in bilateral pectoralis 2 plane block

## 2021-02-16 NOTE — DISCHARGE INSTRUCTIONS
PLASTIC SURGERY INSTRUCTIONS  Bilateral Mastectomy with Nipple Grafts    Instructions    Have someone drive you home after surgery and help you at home for 1-2 days.    Get plenty of rest and follow balanced diet.      Decreased activity may promote constipation, so you may want to add more raw fruit to your diet    Be sure to increase fluid intake.     Do not take aspirin or any products containing aspirin unless approved by your surgeon.    Do not drink alcohol when taking pain medications.    Pain medications:  o Once you get home, take Acetaminophen 650mg every 6 hours, even if your pain is mild, to stay on top of the pain.  o By the second day, if the pain is still bothering you, add Ibuprofen 500mg every 6 hours.   o Stagger acetaminophen and ibuprofen so that you are taking either pain medicine every 3 hours.  o For example:   - 8AM: Acetaminophen  - 11AM: Ibuprofen  - 2PM: Acetaminophen  - 5PM: Ibuprofen   o Use the narcotic pain medication ONLY AS NEEDED in case of emergency after other medications have been tried  o Expect your pain to increase by the 2nd or 3rd day since your chest block injections will have worn off by then    Even when not taking pain medications, do not consume alcohol for 3 weeks as it causes fluid retention.    If you are taking vitamins with iron, resume these as tolerated.    Do not smoke, as smoking delays healing and increases the risk of complications.    Activities    Start walking as soon as possible, this helps to reduce swelling and lowers the chance of blood clots.    Do not drive until you are no longer taking any pain medications (narcotics).    No lifting more than a gallon of milk (5 lbs) for 4 weeks    Do not drive until you have full range of motion with your arms.    Refrain from vigorous activity for 4 weeks    Restrict excessive use of arms for at least 4 weeks.     Keep arms in front of you or down to the side (like a T-Dank)    Do NOT reach for anything about your  head, behind you, or off to the side for 4 weeks    Refrain from physical contact with chest for 4 weeks.      Body contact sports should be avoided for 6-8 weeks.    Social and employment activities can be resumed in 3-10 days.    Incision Care    Do NOT apply ice or heat to your chest. This can compromise your wound healing.    You may shower 48 hours after your drainage tubes have been removed    Drain care:  o Monitor and record your drain output daily. This well help us determine if it is safe to remove at your postoperative visit.  o Milk drain tubing 3-4 times a day to prevent clogging. It helps to use something wet to slide along the tubing, like an alcohol wipe.  o Your drain is sutured to your skin. Do NOT tug on the drain.  o Each drain output needs to be less than 30mL per day for 2 consecutive days prior to removal to decrease risk of fluid accumulation.  o Drains can be removed prior to your first scheduled visit if they meet this criterion.    Wrap Care:  o Keep your wrap on continuously until your first postoperative clinic visit  o You may undo and re-apply your wrap if it is too tight, too loose  o You may also undo and re-apply your wrap if you need to look at your chest for concerns of bleeding or infection  - Signs of bleeding include sudden swelling, pain, craig bruising, increased red bloody drain output  - Signs of infection include fevers, chills, sweats, increasing pain, redness     Please have someone help you with wrap and drain care. Do NOT do this on your own since your arm range of motion should be limited    After showering, pat your incisions dry.    Continue torso wrap for at least 1 month after surgery.    Keep incisions clean and inspect daily for signs of infection.    No tub soaking, bathing, or swimming for 4 weeks.    You may pad the incisions with gauze for comfort.    Refrain from sleeping on your stomach     Always use a strong sunblock, if sun exposure is unavoidable (SPF  50 or greater).    Continue to apply surgical tape to the incision for 3 months. If the tape starts peeling off, clean the incision and apply a new layer of tape. This will help minimize scarring.    We will teach you how to tape your wound and provide tape at your first postoperative appointment    What to Expect    Expect some drainage onto the surgical tape covering the incisions.    You are likely to feel tired for a few days, but you should be up and around in 4-5 days.    Maximum discomfort and swelling will occur in the first few days after surgery.    You may experience temporary soreness, tightness, swelling and bruising as well as some discomfort in the incision area.      Appearance    Most of the discoloration and swelling will subside in 4-6 weeks.    Scars may be red and angry looking for 6 months. In time, these usually soften and fade.    Your chest shape will change up to 1 year after surgery    Please note my office will call you 1-2 business days after your procedure to check up on how you're doing and to schedule your post-operative appointment.     When to Call:    If you have increased swelling or bruising.    If swelling and redness persist for a few days.    If you have increased redness along the incision.  If you have severe or increased pain not relieved by medication.    If you have any side effects to medications; such as, rash, nausea, headache, vomiting or constipation.    If you have an oral temperature over 100.4 degrees.    If you have any yellowish or greenish drainage from the incisions or notice a foul odor.    If you have bleeding from the incisions that is difficult to control with light pressure.    If you develop increased pain in your calves, shortness of breath, or chest pain.    If you develop any symptoms of concern.     For Medical Questions, Please Call:    [phone],  Monday - Friday, 8 a.m. - 4:30 p.m.    After hours and on weekends, call Hospital Paging at  [phone] and ask for the Plastic Surgeon on call.    If you need to go to the ER, please go to [facility]  Home Care Following Anesthesia  For 24 hours after surgery:  1. Get plenty of rest.  A responsible adult must stay with you for at least 24 hours after you leave the surgery center.  2. Do not drive or use heavy equipment.  If you have weakness or tingling, don't drive or use heavy equipment until this feeling goes away.   3. Do not drink alcohol.   4. Avoid strenuous or risky activities.  Ask for help when climbing stairs.  5. You may feel lightheaded.  IF so, sit for a few minutes before standing.  Have someone help you get up.   6. If you have nausea (feel sick to your stomach): Drink only clear liquids such as apple juice, ginger ale, broth or 7-Up.  Rest may also help.  Be sure to drink enough fluids.  Move to a regular diet as you feel able.   7. You may have a slight fever.  Call the doctor if your fever is over 100 F (37.7 C) (taken under the tongue) or lasts longer than 24 hours.  8. You may have a dry mouth, a sore throat, muscle aches or trouble sleeping. These should go away after 24 hours.  9. Do not make important or legal decisions.               Tips for taking pain medications  To get the best pain relief possible, remember these points:    Take pain medications as directed, before pain becomes severe.    Pain medication can upset your stomach: taking it with food may help.    Constipation is a common side effect of pain medication. Drink plenty of  fluids.    Eat foods high in fiber. Take a stool softener if recommended by your doctor or pharmacist.    Do not drink alcohol, drive or operate machinery while taking pain medications.    Ask about other ways to control pain, such as with heat, ice or relaxation.    Tylenol/Acetaminophen Consumption  To help encourage the safe use of acetaminophen, the makers of  TYLENOL  have lowered the maximum daily dose for single-ingredient Extra Strength TYLENOL  (acetaminophen) products sold in the [place] from 8 pills per day (4,000 mg) to 6 pills per day (3,000 mg). The dosing interval has also changed from 2 pills every 4-6 hours to 2 pills every 6 hours.    If you feel your pain relief is insufficient, you may take Tylenol/Acetaminophen in addition to your narcotic pain medication.     Be careful not to exceed 3,000 mg of Tylenol/Acetaminophen in a 24 hour period from all sources.    If you are taking extra strength Tylenol/acetaminophen (500 mg), the maximum dose is 6 tablets in 24 hours.    If you are taking regular strength acetaminophen (325 mg), the maximum dose is 9 tablets in 24 hours.    Call a doctor for any of the followin. Signs of infection (fever, growing tenderness at the surgery site, a large amount of drainage or bleeding, severe pain, foul-smelling drainage, redness, swelling).  2. It has been over 8 to 10 hours since surgery and you are still not able to urinate (pass water).  3. Headache for over 24 hours.  4. Numbness, tingling or weakness the day after surgery (if you had spinal anesthesia).  5. Signs of Covid-19 infection (temperature over 100 degrees, shortness of breath, cough, loss of taste/smell, generalized body aches, persistent headache, chills, sore throat, nausea/vomiting/diarrhea)  Your doctor is:  Dr. Christie Hines, Plastic Surgery: [phone]                    Or dial [phone] and ask for the resident on call for:  Plastics  For emergency care, call the:  [place]:  [phone] (TTY for hearing impaired: [phone])  Post Operative Instructions: Regional Anesthetic for Chest and Abdominal Surgery    General Information:   Regional anesthesia is when local anesthetic or  numbing  medication is injected around the nerves to anesthetize or  numb  the area supplied by that set of nerves.     Types of Regional  Blocks:  Transversus Abdominis Plane (TAP): A block injected beneath the covering of a muscle layer of the abdomen for abdominal surgery  Pectoral: A block injected near the breast for surgery on the breast and armpit  Paravertebral: A block injected in the back for surgery on the chest, ribs, and breast    Procedure:  The type of anesthesia your doctor used to numb your chest or abdomen will usually not wear off for 6-18 hours, but may last as long as 24 hours.     Diet:  There are no restrictions on your diet. You should drink plenty of fluids.     Discomfort:  You will have a tingling and prickly sensation in your chest or abdomen as the feeling begins to return. You can also expect some discomfort. The amount of discomfort is unpredictable, but if you have more pain than can be controlled with pain medication you should notify your physician.     Pain Medicine:   Begin taking your oral pain pills (if you have not already done so) before bedtime and during the night to avoid a sudden onset of pain as the block wears off.  Do not engage in drinking, driving, or hazardous occupations while taking pain medication.     Stitches:   You may have stitches or special skin closures. You doctor will inform you when to return to the office to have them removed.   Caring for Your Armin-Lynch Drain    You have been discharged with a Armin-Lynch drainage tube. This tube drains fluid from your incision, helping prevent swelling and reducing the risk for infection. The tube is held in place by a few stitches. The drain will be removed when your doctor determines you no longer need it and when the amount of drainage decreases. The color and amount of fluid varies. Right after surgery, the fluid may be bright red and may become clearer over time.   Dressing:    Keep the skin around the tube dry.    If you have a dressing, change it every day.   o Wash your hands.  o Remove the old bandage. Do not use scissors-you may  accidentally cut the tube.  o Check for any redness, swelling, drainage, or broken stitches. (Call your doctor with any of these findings).  o Wash your hands again.  o Wet a cotton swab (Q-tip) and clean around the incision and the tube site. Use normal saline solution (salt and water) or soap and water. Start at the tube site and move outward in a circular motion.   o Pat dry.  o Put a new bandage on the incision and tube site. Make the bandage large enough to cover the whole incision area.   o Tape the bandage in place.  o Throw out old materials and wash your hands.   Home Care:    Tape the tube to the skin below the bandage. Make sure to keep some slack in the tube to keep it from pulling out.     DO NOT sleep on the same side as the tube.    Secure the tube and bulb inside your clothing with a safety pin. This helps keep the tube from being pulled out.     Keep the bulb compressed at all times, except when you empty it.    Empty your drain at least twice a day. Empty it more often if the drain is full.   o Lift the opening of the drain.  o Drain the fluid into a measuring cup.  o Record the amount of fluid each time you empty. Share the information with your doctor at your follow-up visit.   o Squeeze the bulb with your hands until you hear air coming out of the bulb.  o Close the opening.     Tape plastic wrap over the bandage and tube site when you shower.      Stripping  the tube helps keep blood clots from blocking the tube.                         ONLY DO THIS IF YOUR DOCTOR INSTRUCTED YOU TO DO SO!  o Hold the tubing where it leaves the skin with one hand. This keeps it from pulling on the skin.  o Pinch the tubing with the thumb and first finger of your other hand.   o Slowly and firmly pull your thumb and first finger down the tube (squeezing the tube between your fingers). Keep squeezing the tube as you run your fingers towards the bulb. If the pulling hurts or feels like it is coming out of the skin,  STOP. Begin again more gently.  o Let go of the tubing with both hands. If the tube is still blocked, repeat these steps three or four times. Make sure that the bulb is compressed so it creates suction.  When to call your doctor:    New or increased pain around the tube    Redness, warmth, or swelling around the incision or tube    Drainage that is foul smelling    Vomiting    Fever over 101 F degrees    Fluid leaking around the tube    Incision seems not to be healing    Stitches become loose    The tube falls out    Drainage that changes from light pick to dark red    A sudden increase or decrease in the amount of drainage (over 30 ml).  Your drainage record:  Date Time Bulb 1: Amount of drainage (ml or cc) Bulb 2: Amount of drainage (ml or cc) Notes

## 2021-02-16 NOTE — ANESTHESIA POSTPROCEDURE EVALUATION
Patient: Cyndee Hull    Procedure(s):  Bilateral mastectomy    Diagnosis:Gender dysphoria in adolescent and adult [F64.0]  Diagnosis Additional Information: No value filed.    Anesthesia Type:  No value filed.    Note:  Disposition: Outpatient   Postop Pain Control: Uneventful            Sign Out: Well controlled pain   PONV: No   Neuro/Psych: Uneventful            Sign Out: Acceptable/Baseline neuro status   Airway/Respiratory: Uneventful            Sign Out: Acceptable/Baseline resp. status   CV/Hemodynamics: Uneventful            Sign Out: Acceptable CV status   Other NRE: NONE   DID A NON-ROUTINE EVENT OCCUR?          Last vitals:  Vitals:    02/16/21 1012 02/16/21 1030 02/16/21 1100   BP: 109/61 123/73 122/72   Pulse: 92 76 75   Resp: 18 18 16   Temp: 36.6  C (97.9  F)  36.6  C (97.9  F)   SpO2: 100% 100% 100%       Last vitals prior to Anesthesia Care Transfer:  CRNA VITALS  2/16/2021 0923 - 2/16/2021 1023      2/16/2021             Pulse:  95    SpO2:  93 %    Resp Rate (set):  10          Electronically Signed By: Amrit Monge MD  February 16, 2021  11:20 AM

## 2021-02-22 NOTE — PROGRESS NOTES
"Plastic Surgery Outpatient Visit    ID: Cyndee Hull is a 20 year old transgender male / female-to-male s/p bilateral mastectomy 2/16 with Dr. Hines.      S: Doing well overall. R drain 25cc daily x2 days. L drain <10cc. Notices a \"squishing\" feeling to L chest.      O:  /79 (BP Location: Left arm, Patient Position: Chair, Cuff Size: Adult Regular)   Pulse 91   Temp 98.4  F (36.9  C) (Oral)   Ht 1.646 m (5' 4.8\")   SpO2 99%   BMI 40.52 kg/m        Gen: NAD  Chest: bilateral nipple graft bolsters removed, grafts intact. IMF incisions c/d/i with tape intact. No erythema or significant swelling.    Path: in process     A/P:  -healing well  -JACOB drains removed  -continue wrapping x4 weeks post op.   -continue lifting restrictions (5-10lbs)  -ok to shower  -RTC as scheduled, call with questions or concerns    Proimse Javier PA-C  Plastic and Reconstructive Surgery    15 minutes spent on the date of the encounter doing chart review, history and physical, dressing changes, documentation and further activity as noted above.    "

## 2021-02-23 ENCOUNTER — OFFICE VISIT (OUTPATIENT)
Dept: PLASTIC SURGERY | Facility: CLINIC | Age: 21
End: 2021-02-23
Payer: COMMERCIAL

## 2021-02-23 VITALS
TEMPERATURE: 98.4 F | DIASTOLIC BLOOD PRESSURE: 79 MMHG | HEIGHT: 65 IN | HEART RATE: 91 BPM | SYSTOLIC BLOOD PRESSURE: 136 MMHG | BODY MASS INDEX: 40.52 KG/M2 | OXYGEN SATURATION: 99 %

## 2021-02-23 DIAGNOSIS — F64.0 GENDER DYSPHORIA IN ADOLESCENT AND ADULT: Primary | ICD-10-CM

## 2021-02-23 LAB — COPATH REPORT: NORMAL

## 2021-02-23 PROCEDURE — 99024 POSTOP FOLLOW-UP VISIT: CPT | Performed by: PHYSICIAN ASSISTANT

## 2021-02-23 ASSESSMENT — PAIN SCALES - GENERAL: PAINLEVEL: MILD PAIN (2)

## 2021-02-23 NOTE — NURSING NOTE
"Chief Complaint   Patient presents with     Surgical Followup     1wk post op anna mast w/nips DOS 2/16 (Donny)       Vitals:    02/23/21 1041   BP: 136/79   BP Location: Left arm   Patient Position: Chair   Cuff Size: Adult Regular   Pulse: 91   Temp: 98.4  F (36.9  C)   TempSrc: Oral   SpO2: 99%   Height: 1.646 m (5' 4.8\")       Body mass index is 40.52 kg/m .    Phani Cuba, EMT    "

## 2021-02-23 NOTE — LETTER
"2/23/2021       RE: Cyndee Hull  [address] [place] [id number]     Dear Colleague,    Thank you for referring your patient, Cyndee Hull, to the [facility] PLASTIC [facility] RECON[facility] SURGERY CLINIC [place] at [facility]. Please see a copy of my visit note below.    Plastic Surgery Outpatient Visit    ID: Cyndee Hull is a 20 year old transgender male / female-to-male s/p bilateral mastectomy 2/16 with Dr. Hines.      S: Doing well overall. R drain 25cc daily x2 days. L drain <10cc. Notices a \"squishing\" feeling to L chest.      O:  /79 (BP Location: Left arm, Patient Position: Chair, Cuff Size: Adult Regular)   Pulse 91   Temp 98.4  F (36.9  C) (Oral)   Ht 1.646 m (5' 4.8\")   SpO2 99%   BMI 40.52 kg/m        Gen: NAD  Chest: bilateral nipple graft bolsters removed, grafts intact. IMF incisions c/d/i with tape intact. No erythema or significant swelling.    Path: in process     A/P:  -healing well  -JACOB drains removed  -continue wrapping x4 weeks post op.   -continue lifting restrictions (5-10lbs)  -ok to shower  -RTC as scheduled, call with questions or concerns    Promise Javier PA-C  Plastic and Reconstructive Surgery    15 minutes spent on the date of the encounter doing chart review, history and physical, dressing changes, documentation and further activity as noted above.    "

## 2021-02-23 NOTE — PATIENT INSTRUCTIONS
Care of Chest Incisions    Keep compression on for a total of 4 weeks from your surgery. No lifting greater than 5 lbs for 4 weeks from your surgery date. Begin applying Medipore tape to the incision. This will be worn for 3 months. If this tape loosens or pieces of it come off, replace with a fresh piece. When changing the Medipore tape you may gently rub away any excess glue in the area.     At one month post op, baseline shoulder motion should return. You may start to exercise lightly at this time, gradually moving up to arm movement. Full shoulder motion should return by 8 weeks from your surgery date.     When to call:  Sudden increase of swelling or pain on one side  Uncontrolled pain despite pain medication  Worsening of chest swelling   Separation of nipple from chest skin  Redness and warmth in chest area  Fever > 101     Contact the RN between 8-3:30 Mon-Fri with questions or concerns through my chart message via your doctor's name (most efficient) or call at [phone].      For urgent medical issues that cannot wait, call [phone] Mon-Fri 8:-4:30.     After hours, weekends or holidays, call [phone] and ask to speak to the on call plastic surgeon fellow.

## 2021-03-01 DIAGNOSIS — F64.0 GENDER DYSPHORIA IN ADOLESCENT AND ADULT: Primary | ICD-10-CM

## 2021-03-01 RX ORDER — HYDROCORTISONE VALERATE 2 MG/G
OINTMENT TOPICAL 2 TIMES DAILY
Qty: 45 G | Refills: 0 | Status: SHIPPED | OUTPATIENT
Start: 2021-03-01 | End: 2021-03-08

## 2021-03-19 ENCOUNTER — OFFICE VISIT (OUTPATIENT)
Dept: PLASTIC SURGERY | Facility: CLINIC | Age: 21
End: 2021-03-19
Payer: COMMERCIAL

## 2021-03-19 VITALS
TEMPERATURE: 98.1 F | OXYGEN SATURATION: 99 % | HEIGHT: 65 IN | DIASTOLIC BLOOD PRESSURE: 84 MMHG | SYSTOLIC BLOOD PRESSURE: 133 MMHG | BODY MASS INDEX: 40.52 KG/M2 | HEART RATE: 97 BPM

## 2021-03-19 DIAGNOSIS — F64.0 GENDER DYSPHORIA IN ADOLESCENT AND ADULT: Primary | ICD-10-CM

## 2021-03-19 PROBLEM — F90.2 ATTENTION DEFICIT HYPERACTIVITY DISORDER (ADHD), COMBINED TYPE: Status: ACTIVE | Noted: 2018-06-01

## 2021-03-19 PROCEDURE — 99024 POSTOP FOLLOW-UP VISIT: CPT | Performed by: PLASTIC SURGERY

## 2021-03-19 RX ORDER — NEEDLES, DISPOSABLE 25GX5/8"
NEEDLE, DISPOSABLE MISCELLANEOUS
COMMUNITY
Start: 2021-03-03

## 2021-03-19 ASSESSMENT — PAIN SCALES - GENERAL: PAINLEVEL: NO PAIN (0)

## 2021-03-19 NOTE — PROGRESS NOTES
PLASTIC SURGERY OUTPATIENT NOTE     SUBJECTIVE  Allergic reactions to skin glue. Now resolved. Not really taping.    PHYSICAL EXAM  No acute distress  Regular  Nonlabored  BILATERAL CHEST INCISIONS c/d/i, no erythema/drainage, no signs of infection  Warm, well-perfused    ASSESSMENT/PLAN  20 year old transgender person with gender dysphoria status post bilateral mastectomy 2/16/2021, no acute issues  - no further need for compression  - resume activities as tolerated  - follow-up for final visit    Total time spent with for this visit was 20 minutes, including review of documentation, counseling/discussion with patient, documentation, and organizing any potential follow-up.    Christie Hines MD  Pediatric Cleft and Craniofacial Surgery  Division of Plastic Surgery  Pager: [phone]

## 2021-03-19 NOTE — NURSING NOTE
"Chief Complaint   Patient presents with     Surgical Followup     4wk post op, anna mast DOS 2/16       Vitals:    03/19/21 1133   BP: 133/84   BP Location: Left arm   Patient Position: Chair   Cuff Size: Adult Regular   Pulse: 97   Temp: 98.1  F (36.7  C)   TempSrc: Oral   SpO2: 99%   Height: 1.646 m (5' 4.8\")       Body mass index is 40.52 kg/m .    Phani Cuba, EMT    "

## 2021-03-19 NOTE — LETTER
3/19/2021       RE: Cyndee Hull  [address] [place] [id number]     Dear Colleague,    Thank you for referring your patient, Cyndee Hull, to the [facility] PLASTIC AND RECONSTRUCTIVE SURGERY CLINIC [place] at [facility]. Please see a copy of my visit note below.    PLASTIC SURGERY OUTPATIENT NOTE     SUBJECTIVE  Allergic reactions to skin glue. Now resolved. Not really taping.    PHYSICAL EXAM  No acute distress  Regular  Nonlabored  BILATERAL CHEST INCISIONS c/d/i, no erythema/drainage, no signs of infection  Warm, well-perfused    ASSESSMENT/PLAN  20 year old transgender person with gender dysphoria status post bilateral mastectomy 2/16/2021, no acute issues  - no further need for compression  - resume activities as tolerated  - follow-up for final visit    Total time spent with for this visit was 20 minutes, including review of documentation, counseling/discussion with patient, documentation, and organizing any potential follow-up.    Christie Hines MD  Pediatric Cleft and Craniofacial Surgery  Division of Plastic Surgery  Pager: [phone]

## 2021-06-18 ENCOUNTER — OFFICE VISIT (OUTPATIENT)
Dept: PLASTIC SURGERY | Facility: CLINIC | Age: 21
End: 2021-06-18
Payer: COMMERCIAL

## 2021-06-18 VITALS
SYSTOLIC BLOOD PRESSURE: 129 MMHG | BODY MASS INDEX: 39.22 KG/M2 | OXYGEN SATURATION: 100 % | HEIGHT: 65 IN | WEIGHT: 235.4 LBS | HEART RATE: 91 BPM | DIASTOLIC BLOOD PRESSURE: 80 MMHG

## 2021-06-18 DIAGNOSIS — F64.0 GENDER DYSPHORIA IN ADOLESCENT AND ADULT: Primary | ICD-10-CM

## 2021-06-18 PROCEDURE — 99213 OFFICE O/P EST LOW 20 MIN: CPT | Performed by: PLASTIC SURGERY

## 2021-06-18 ASSESSMENT — PAIN SCALES - GENERAL: PAINLEVEL: NO PAIN (0)

## 2021-06-18 ASSESSMENT — MIFFLIN-ST. JEOR: SCORE: 1820.47

## 2021-06-18 NOTE — PROGRESS NOTES
PLASTIC SURGERY OUTPATIENT NOTE     SUBJECTIVE  No interim events. Chronic cyst along lateral corner of RIGHT incision which self-resolved.    PHYSICAL EXAM  No acute distress  Regular  Nonlabored  BILATERAL CHEST INCISIONS c/d/i, no erythema/drainage, no signs of infection  Warm, well-perfused    ASSESSMENT/PLAN  21 year old transgender person with gender dysphoria status post bilateral mastectomy with  2/16/2021, no acute issues  - no further precautions at this time  - activities as tolerated  - follow-up prn or if interested in revision at 6-12 months postop    Total time spent with for this visit was 20 minutes, including review of documentation, counseling/discussion with patient, documentation, and organizing any potential follow-up.    Christie Hines MD  Pediatric Cleft and Craniofacial Surgery  Division of Plastic Surgery  Pager: [phone]

## 2021-06-18 NOTE — NURSING NOTE
"Chief Complaint   Patient presents with     RECHECK     4 month post op DOS 2/16.       Vitals:    06/18/21 1137   BP: 129/80   BP Location: Left arm   Patient Position: Sitting   Cuff Size: Adult Regular   Pulse: 91   SpO2: 100%   Weight: 106.8 kg (235 lb 6.4 oz)   Height: 1.646 m (5' 4.8\")       Body mass index is 39.41 kg/m .                          Malgorzata Jacobs, EMT    "

## 2021-09-26 ENCOUNTER — HEALTH MAINTENANCE LETTER (OUTPATIENT)
Age: 21
End: 2021-09-26

## 2021-10-19 PROBLEM — F32.9 MAJOR DEPRESSION: Status: ACTIVE | Noted: 2020-12-11

## 2022-01-15 ENCOUNTER — HEALTH MAINTENANCE LETTER (OUTPATIENT)
Age: 22
End: 2022-01-15

## 2022-07-30 NOTE — LETTER
6/18/2021       RE: Cyndee Hull  [address] [facility] [address]     Dear Colleague,    Thank you for referring your patient, Cyndee Hull, to the [facility] PLASTIC AND RECONSTRUCTIVE SURGERY CLINIC [place] at [facility]. Please see a copy of my visit note below.    PLASTIC SURGERY OUTPATIENT NOTE     SUBJECTIVE  No interim events. Chronic cyst along lateral corner of RIGHT incision which self-resolved.    PHYSICAL EXAM  No acute distress  Regular  Nonlabored  BILATERAL CHEST INCISIONS c/d/i, no erythema/drainage, no signs of infection  Warm, well-perfused    ASSESSMENT/PLAN  21 year old transgender person with gender dysphoria status post bilateral mastectomy with  2/16/2021, no acute issues  - no further precautions at this time  - activities as tolerated  - follow-up prn or if interested in revision at 6-12 months postop    Total time spent with for this visit was 20 minutes, including review of documentation, counseling/discussion with patient, documentation, and organizing any potential follow-up.    Christie Hines MD  Pediatric Cleft and Craniofacial Surgery  Division of Plastic Surgery  Pager: [phone]            Again, thank you for allowing me to participate in the care of your patient.      Sincerely,    Christie Hines MD       Anesthesia Pre Eval Note    Anesthesia ROS/Med Hx        Additional Results:     ALLERGIES:  No Known Allergies       Last Labs        Component                Value               Date/Time                  WBC                      11.4 (H)            07/30/2022 0559            RBC                      4.88                07/30/2022 0559            HGB                      14.3                07/30/2022 0559            HCT                      42.1                07/30/2022 0559            MCV                      86.3                07/30/2022 0559            MCH                      29.3                07/30/2022 0559            MCHC                     34.0                07/30/2022 0559            RDW-CV                   12.2                07/30/2022 0559            PLT                      180                 07/30/2022 0559        No past medical history on file.    No past surgical history on file.       Prior to Admission medications :  Not on File       Patient Vitals in the past 24 hrs:  07/30/22 0537, BP:(!) 152/106, Temp:37.1 °C (98.8 °F), Temp src:Temporal, Pulse:(!) 129, Resp:20, Weight:65 kg (143 lb 4.8 oz)      Relevant Problems   No relevant active problems       Physical Exam     Airway   Mallampati: IV  TM Distance: >3 FB  Neck ROM: Full  Neck: Non-tender  TMJ Mobility: Good  Patient has airway adjunct and oral ET tube    Cardiovascular    Cardio Rhythm: Regular  Cardio Rate: Abnormal    General Assessment  General Assessment: Severe distress    Pulmonary Exam    Breath sounds clear to auscultation:  Yes      Anesthesia Plan:    ASA Status: 5Emergent    Anesthesia Type: General    Induction: Intravenous  Preferred Airway Type: ETT  Maintenance: Inhalational  Premedication: None      Post-op Pain Management: Per Surgeon      Checklist  Reviewed: NPO Status, Allergies, Medications, Problem list, Past Med History and Lab Results  Monitors  Discussed risks of the following Invasive monitors with  patient: Arterial Line and Central Line      Consent: Unable to obtain due to emergent case

## 2023-04-23 ENCOUNTER — HEALTH MAINTENANCE LETTER (OUTPATIENT)
Age: 23
End: 2023-04-23

## 2024-09-03 ENCOUNTER — TRANSFERRED RECORDS (OUTPATIENT)
Dept: HEALTH INFORMATION MANAGEMENT | Facility: CLINIC | Age: 24
End: 2024-09-03

## 2024-10-30 ENCOUNTER — OFFICE VISIT (OUTPATIENT)
Dept: FAMILY MEDICINE | Facility: CLINIC | Age: 24
End: 2024-10-30
Payer: COMMERCIAL

## 2024-10-30 ENCOUNTER — MEDICAL CORRESPONDENCE (OUTPATIENT)
Dept: HEALTH INFORMATION MANAGEMENT | Facility: CLINIC | Age: 24
End: 2024-10-30

## 2024-10-30 VITALS
RESPIRATION RATE: 20 BRPM | SYSTOLIC BLOOD PRESSURE: 137 MMHG | HEIGHT: 65 IN | OXYGEN SATURATION: 99 % | DIASTOLIC BLOOD PRESSURE: 87 MMHG | WEIGHT: 269.4 LBS | TEMPERATURE: 97.8 F | BODY MASS INDEX: 44.89 KG/M2 | HEART RATE: 129 BPM

## 2024-10-30 DIAGNOSIS — Z76.89 ENCOUNTER TO ESTABLISH CARE: Primary | ICD-10-CM

## 2024-10-30 DIAGNOSIS — G43.709 CHRONIC MIGRAINE WITHOUT AURA WITHOUT STATUS MIGRAINOSUS, NOT INTRACTABLE: ICD-10-CM

## 2024-10-30 DIAGNOSIS — F90.2 ATTENTION DEFICIT HYPERACTIVITY DISORDER (ADHD), COMBINED TYPE: ICD-10-CM

## 2024-10-30 DIAGNOSIS — Z11.4 SCREENING FOR HIV (HUMAN IMMUNODEFICIENCY VIRUS): ICD-10-CM

## 2024-10-30 DIAGNOSIS — F33.41 RECURRENT MAJOR DEPRESSION IN PARTIAL REMISSION (H): ICD-10-CM

## 2024-10-30 DIAGNOSIS — Z11.59 NEED FOR HEPATITIS C SCREENING TEST: ICD-10-CM

## 2024-10-30 DIAGNOSIS — R53.82 CHRONIC FATIGUE: ICD-10-CM

## 2024-10-30 PROBLEM — F60.3 BORDERLINE PERSONALITY DISORDER (H): Status: RESOLVED | Noted: 2020-12-11 | Resolved: 2024-10-30

## 2024-10-30 PROBLEM — F84.0 AUTISM SPECTRUM: Status: ACTIVE | Noted: 2024-10-30

## 2024-10-30 LAB
ERYTHROCYTE [DISTWIDTH] IN BLOOD BY AUTOMATED COUNT: 13.2 % (ref 10–15)
HCT VFR BLD AUTO: 46.9 % (ref 35–53)
HGB BLD-MCNC: 14.8 G/DL (ref 11.7–17.7)
MCH RBC QN AUTO: 27.2 PG (ref 26.5–33)
MCHC RBC AUTO-ENTMCNC: 31.6 G/DL (ref 31.5–36.5)
MCV RBC AUTO: 86 FL (ref 78–100)
PLATELET # BLD AUTO: 314 10E3/UL (ref 150–450)
RBC # BLD AUTO: 5.44 10E6/UL (ref 3.8–5.9)
WBC # BLD AUTO: 8.3 10E3/UL (ref 4–11)

## 2024-10-30 PROCEDURE — 99204 OFFICE O/P NEW MOD 45 MIN: CPT | Mod: GC

## 2024-10-30 PROCEDURE — 86803 HEPATITIS C AB TEST: CPT

## 2024-10-30 PROCEDURE — 80053 COMPREHEN METABOLIC PANEL: CPT

## 2024-10-30 PROCEDURE — 85027 COMPLETE CBC AUTOMATED: CPT

## 2024-10-30 PROCEDURE — 84443 ASSAY THYROID STIM HORMONE: CPT

## 2024-10-30 PROCEDURE — 82306 VITAMIN D 25 HYDROXY: CPT

## 2024-10-30 PROCEDURE — 87389 HIV-1 AG W/HIV-1&-2 AB AG IA: CPT

## 2024-10-30 PROCEDURE — 36415 COLL VENOUS BLD VENIPUNCTURE: CPT

## 2024-10-30 PROCEDURE — 82607 VITAMIN B-12: CPT

## 2024-10-30 RX ORDER — SUMATRIPTAN SUCCINATE 25 MG/1
25 TABLET ORAL
Qty: 90 TABLET | Refills: 0 | Status: SHIPPED | OUTPATIENT
Start: 2024-10-30 | End: 2024-11-12

## 2024-10-30 RX ORDER — VENLAFAXINE HYDROCHLORIDE 75 MG/1
75 CAPSULE, EXTENDED RELEASE ORAL DAILY
COMMUNITY

## 2024-10-30 RX ORDER — PROPRANOLOL HYDROCHLORIDE 40 MG/1
40 TABLET ORAL 2 TIMES DAILY
Qty: 180 TABLET | Refills: 1 | Status: SHIPPED | OUTPATIENT
Start: 2024-10-30

## 2024-10-30 ASSESSMENT — PATIENT HEALTH QUESTIONNAIRE - PHQ9
10. IF YOU CHECKED OFF ANY PROBLEMS, HOW DIFFICULT HAVE THESE PROBLEMS MADE IT FOR YOU TO DO YOUR WORK, TAKE CARE OF THINGS AT HOME, OR GET ALONG WITH OTHER PEOPLE: VERY DIFFICULT
SUM OF ALL RESPONSES TO PHQ QUESTIONS 1-9: 19
SUM OF ALL RESPONSES TO PHQ QUESTIONS 1-9: 19

## 2024-10-30 NOTE — PROGRESS NOTES
Preceptor Attestation:    I discussed the patient with the resident and evaluated the patient in person. I have verified the content of the note, which accurately reflects my assessment of the patient and the plan of care.   Supervising Physician:  Chad Gaffney MD.

## 2024-10-30 NOTE — PATIENT INSTRUCTIONS
Progress Note    Iam Ahuja                           Baby's First Name =  Carmelita  YOB: 2024      Gender: female BW: 8 lb 6.9 oz (3825 g)   Age: 29 hours Obstetrician: JESUS MANUEL CAGE    Gestational Age: 40w4d            MATERNAL INFORMATION     Mother's Name: Dona Ahuja    Age: 34 y.o.              PREGNANCY INFORMATION           Maternal /Para:      Information for the patient's mother:  Dona Ahuja [[id number]]     Patient Active Problem List   Diagnosis   • Uterine fibroids affecting pregnancy, antepartum   • Pregnancy   • Post-dates pregnancy   • Currently pregnant        Prenatal records, US and labs reviewed.    PRENATAL RECORDS:    Prenatal Course: benign      MATERNAL PRENATAL LABS:      MBT: A+  RUBELLA: immune  HBsAg:Negative   RPR:  Non Reactive  HIV: Negative  HEP C Ab: Negative  UDS: Negative  GBS Culture: Negative  Genetic Testing: Not listed in PNR  COVID 19 Screen: Not detected    PRENATAL ULTRASOUND :    Normal             MATERNAL MEDICAL, SOCIAL, GENETIC AND FAMILY HISTORY      Past Medical History:   Diagnosis Date   • Allergic    • Uterine fibroid           Family, Maternal or History of DDH, CHD, Renal, HSV, MRSA and Genetic:     Non-significant    Maternal Medications:     Information for the patient's mother:  Dona Ahuja [[id number]]   docusate sodium, 100 mg, Oral, BID  erythromycin, , ,                 LABOR AND DELIVERY SUMMARY        Rupture date:  2022   Rupture time:  12:47 AM  ROM prior to Delivery: 8h 13m     Antibiotics during Labor: No   EOS Calculator Screen: With well appearing baby supports Routine Vitals and Care    YOB: 2024   Time of birth:  9:00 AM  Delivery type:  Vaginal, Vacuum (Extractor)   Presentation/Position: Vertex;               APGAR SCORES:    Totals: 8   9                        INFORMATION     Vital Signs Temp:  [98 °F (36.7 °C)-98.2 °F (36.8 °C)] 98 °F  "Your emotional health is important to us!  If you feel that you may hurt yourself or others, please seek help through the hospital ER, or 9-1-1.  You may also call [place] Crisis Connection, toll-free, at 1.671.740.8062 for immediate support.      The National Suicide Prevention Lifeline at  988 or 1-134.618.2388 can be reached 24/7.    Trans Lifeline can be reached at 826-905-8113.   For LGBT youth (ages 24 and younger) contemplating suicide, the Larry [employer] Lifeline can be reached at 1-694.392.6903.  Larry Program: Text \"start\" to 782 954    " "(36.7 °C)  Pulse:  [120-136] 120  Resp:  [32-44] 44   Birth Weight: 3825 g (8 lb 6.9 oz)   Birth Length: (inches) 20.5   Birth Head Circumference: Head Circumference: 14.17\" (36 cm)     Current Weight: Weight: 3770 g (8 lb 5 oz)   Weight Change from Birth Weight: -1%           PHYSICAL EXAMINATION     General appearance Alert and active .   Skin  No rashes or petechiae. Jaime.   HEENT: Mild molding (R>L), but no apparent cephalohematoma  AFSF. Positive RR bilaterally. Palate intact.   Chest Clear breath sounds bilaterally. No distress.   Heart  Normal rate and rhythm.  No murmur.  Normal pulses.    Abdomen + BS.  Soft, non-tender. No mass/HSM   Genitalia  Normal female.  Patent anus.   Trunk and Spine Spine normal and intact.  No atypical dimpling.   Extremities  Clavicles intact.  No hip clicks/clunks.   Neuro Normal reflexes.  Normal Tone.             LABORATORY AND RADIOLOGY RESULTS      LABS:    Recent Results (from the past 96 hour(s))   POC Glucose Once    Collection Time: 22 11:20 AM    Specimen: Blood   Result Value Ref Range    Glucose 73 (L) 75 - 110 mg/dL   POC Glucose Once    Collection Time: 22  1:07 PM    Specimen: Blood   Result Value Ref Range    Glucose 61 (L) 75 - 110 mg/dL   POC Glucose Once    Collection Time: 22  4:11 AM    Specimen: Blood   Result Value Ref Range    Glucose 75 75 - 110 mg/dL       XRAYS:    No orders to display               DIAGNOSIS / ASSESSMENT / PLAN OF TREATMENT      ___________________________________________________________    TERM INFANT    HISTORY:  Gestational Age: 40w4d; female  Vaginal, Vacuum (Extractor); Vertex  BW: 8 lb 6.9 oz (3825 g)  Mother is planning to breast feed and bottle feed    DAILY ASSESSMENT:  Today's Weight: 3770 g (8 lb 5 oz)  Weight change from BW:  -1%  Feedings: Nursing 0-10 minutes/session. Taking 14-15 mL formula/feed  Voids/Stools: Normal    PLAN:   Normal  care.   Bili and  State Screen per routine  Parents " to make follow up appointment with PCP before discharge    ___________________________________________________________                                                               DISCHARGE PLANNING             HEALTHCARE MAINTENANCE     CCHD     Car Seat Challenge Test  N/A    Hearing Screen Hearing Screen Date: 22 (22 0840)  Hearing Screen, Right Ear: passed, ABR (auditory brainstem response) (22 0840)  Hearing Screen, Left Ear: passed, ABR (auditory brainstem response) (22 0840)   [place] State  Screen           Vitamin K  phytonadione (VITAMIN K) injection 1 mg first administered on 2022 11:00 AM    Erythromycin Eye Ointment  erythromycin (ROMYCIN) ophthalmic ointment 1 application first administered on 2022  9:15 AM    Hepatitis B Vaccine  Immunization History   Administered Date(s) Administered   • Hep B, Adolescent or Pediatric 2022               FOLLOW UP APPOINTMENTS     1) PCP: [facility]            PENDING TEST  RESULTS AT TIME OF DISCHARGE     1) [place] STATE  SCREEN          PARENT  UPDATE  / SIGNATURE     Infant examined, chart reviewed, and parents updated.    Discussed the following:    -feedings  -current weight and % loss from birth weight  -blood glucoses  - screens  -PCP scheduling (recommended scheduling f/u appointment for )    Questions addressed      Maria Isabel Brown MD  2022  14:23 EDT

## 2024-10-30 NOTE — PROGRESS NOTES
Assessment & Plan     Encounter to establish care  Ellis is a 24-year-old transgender male who presents to the clinic to establish care.  He has a past medical history of hypothyroidism on levothyroxine, exercise-induced asthma, prediabetes, ADHD, depression, and gender affirming hormone therapy through [facility].    Patient with complicated medical history and health anxiety, worsened by poor previous health encounters.  He presents with a list of health concerns neatly typed out and categorized, scanned to media.  Symptoms generally revolve around chronic pain, chronic fatigue, nonspecific weakness, poor sleep, brain fog, in addition to the items listed below.    Recurrent major depression in partial remission (H)  Attention deficit hyperactivity disorder (ADHD), combined type  Patient symptoms of chronic fatigue, nonspecific weakness, poor sleep, brain fog all potentially exacerbated by poor mental health.  PHQ-9 19 today.  Endorses passive suicidal ideation over the past several years, no plan.  He has significant mental health connections and that he has a mental health psychiatrist who manages his medications who he sees on a every 2-month basis.  He also has an outpatient therapist that he sees on a weekly basis.  He specifically feels like his mental health has been exacerbated by his physical health.  He declines additional referrals or recommendations at this time.  Reviewed crisis resources together in person today.    Chronic fatigue  Hypothyroidism  Patient presents with chronic fatigue, weakness symptoms, all nonspecific.  Suspect likely related to poor mental health.  Patient also has a history of hypothyroidism which is treated with levothyroxine 50 mcg daily.  Last TSH within normal limits when checked April 2024.  Plan to obtain additional laboratory testing today to rule out other common causes of fatigue including poorly controlled hypothyroidism, anemia, electrolyte dysfunction,  "liver disease, vitamin B12 or vitamin D deficiency.  Patient has specific concerns regarding a diagnosis of myalgic encephalomyelitis or chronic fatigue syndrome.  - CBC with Platelets and Reflex to Iron Studies  - Comprehensive metabolic panel  - TSH with free T4 reflex  - Vitamin D Level  - Vitamin B12    Chronic migraine without aura without status migrainosus, not intractable  Patient describes daily, debilitating unilateral headaches associated with nausea, vomiting, sensitivity to light and noise, vision changes, dizziness, presyncope.  Symptoms sound consistent with migraines.  He has intermittently tried Excedrin for management of symptoms without much relief.  Given this seems to be a significant contributor to his \"physical\" symptoms, will initiate treatment with both abortive sumatriptan and prophylactic propranolol.  Plan for symptom reassessment in 2 weeks.  - propranolol (INDERAL) 40 MG tablet  Dispense: 180 tablet; Refill: 1  - SUMAtriptan (IMITREX) 25 MG tablet  Dispense: 90 tablet; Refill: 0    Screening for HIV (human immunodeficiency virus)  Patient is due for routine health maintenance screening as recommended by USPSTF guidelines. Patient agreeable to proceed with the following screenings listed below.  - HIV Screening    Need for hepatitis C screening test  Patient is due for routine health maintenance screening as recommended by USPSTF guidelines. Patient agreeable to proceed with the following screenings listed below.  - Hepatitis C Screen Reflex to HCV RNA Quant and Genotype      Return in about 2 weeks (around 11/13/2024).    Chanelle Corral is a 24 year old, presenting for the following health issues:  OTHER (Physical health)        10/30/2024     1:09 PM   Additional Questions   Roomed by Doua   Accompanied by Self         10/30/2024     1:09 PM   Patient Reported Additional Medications   Patient reports taking the following new medications n/a         10/30/2024    " "Information    services provided? No      HPI   Patient here to discuss the following:    Establish care:  - Wanting to establish with Dr. Aguilar  - Could not get appointment till December  - Needed appointment to get started before then    Mental health:  - Sees Rabia Holt, now at private practice, CAROLE requested  - Weekly therapy   - Seeing every 1-2 months  - Current medications: Effexor 75 mg, Vyvanse 70 mg? Daily  - Feels mental health is bad because of physical health  - Passive SI, no plan, has felt this way for years    - In grad school, masters in library work  - Works at [employer] as [employer], enjoys this  - More family and friends in [place]        10/30/2024     1:08 PM   PHQ   PHQ-9 Total Score 19    Q9: Thoughts of better off dead/self-harm past 2 weeks Several days    F/U: Thoughts of suicide or self-harm No    F/U: Safety concerns Yes        Patient-reported       Physical health:  - Chronic pain  - Chronic fatigue, nonspecific weakness  - Migraine symptoms   - Takes excedrine not much relief   - Left sided, vision changes, feels like fainting            Objective    /87   Pulse (!) 129   Temp 97.8  F (36.6  C) (Oral)   Resp 20   Ht 1.651 m (5' 5\")   Wt 122.2 kg (269 lb 6.4 oz)   SpO2 99%   BMI 44.83 kg/m    Body mass index is 44.83 kg/m .  Physical Exam  Vitals reviewed.   Constitutional:       General: He is not in acute distress.     Appearance: Normal appearance.   HENT:      Head: Normocephalic and atraumatic.   Pulmonary:      Effort: Pulmonary effort is normal. No respiratory distress.   Skin:     General: Skin is warm and dry.   Neurological:      Mental Status: He is alert. Mental status is at baseline.   Psychiatric:         Mood and Affect: Mood normal.         Behavior: Behavior normal.         Thought Content: Thought content normal.         Judgment: Judgment normal.            Signed Electronically by: Amrit Otero DO    "

## 2024-10-30 NOTE — Clinical Note
FYI: patient wanting to establish with you. Scheduled for Dec 4. I will see them in the meantime. Has medical history and problems all typed out. Copies scanned to chart. Would be beneficial to review prior to seeing this patient.

## 2024-10-31 LAB
ALBUMIN SERPL BCG-MCNC: 4.8 G/DL (ref 3.5–5.2)
ALP SERPL-CCNC: 58 U/L (ref 40–150)
ALT SERPL W P-5'-P-CCNC: 59 U/L (ref 0–70)
ANION GAP SERPL CALCULATED.3IONS-SCNC: 12 MMOL/L (ref 7–15)
AST SERPL W P-5'-P-CCNC: 35 U/L (ref 0–45)
BILIRUB SERPL-MCNC: 0.3 MG/DL
BUN SERPL-MCNC: 7.7 MG/DL (ref 6–20)
CALCIUM SERPL-MCNC: 9.9 MG/DL (ref 8.8–10.4)
CHLORIDE SERPL-SCNC: 101 MMOL/L (ref 98–107)
CREAT SERPL-MCNC: 0.72 MG/DL (ref 0.51–1.17)
EGFRCR SERPLBLD CKD-EPI 2021: >90 ML/MIN/1.73M2
GLUCOSE SERPL-MCNC: 96 MG/DL (ref 70–99)
HCO3 SERPL-SCNC: 26 MMOL/L (ref 22–29)
HCV AB SERPL QL IA: NONREACTIVE
HIV 1+2 AB+HIV1 P24 AG SERPL QL IA: NONREACTIVE
POTASSIUM SERPL-SCNC: 4.7 MMOL/L (ref 3.4–5.3)
PROT SERPL-MCNC: 7.6 G/DL (ref 6.4–8.3)
SODIUM SERPL-SCNC: 139 MMOL/L (ref 135–145)
TSH SERPL DL<=0.005 MIU/L-ACNC: 1.3 UIU/ML (ref 0.3–4.2)
VIT B12 SERPL-MCNC: 464 PG/ML (ref 232–1245)
VIT D+METAB SERPL-MCNC: 56 NG/ML (ref 20–50)

## 2024-11-12 ENCOUNTER — OFFICE VISIT (OUTPATIENT)
Dept: FAMILY MEDICINE | Facility: CLINIC | Age: 24
End: 2024-11-12
Payer: COMMERCIAL

## 2024-11-12 ENCOUNTER — ORDERS ONLY (AUTO-RELEASED) (OUTPATIENT)
Dept: FAMILY MEDICINE | Facility: CLINIC | Age: 24
End: 2024-11-12

## 2024-11-12 VITALS
HEART RATE: 114 BPM | WEIGHT: 268.6 LBS | HEIGHT: 65 IN | BODY MASS INDEX: 44.75 KG/M2 | TEMPERATURE: 98.2 F | DIASTOLIC BLOOD PRESSURE: 83 MMHG | RESPIRATION RATE: 14 BRPM | OXYGEN SATURATION: 96 % | SYSTOLIC BLOOD PRESSURE: 126 MMHG

## 2024-11-12 DIAGNOSIS — G43.109 MIGRAINE WITH AURA AND WITHOUT STATUS MIGRAINOSUS, NOT INTRACTABLE: ICD-10-CM

## 2024-11-12 DIAGNOSIS — Z91.89 PNEUMOCOCCAL VACCINATION INDICATED: ICD-10-CM

## 2024-11-12 DIAGNOSIS — R53.82 CHRONIC FATIGUE: ICD-10-CM

## 2024-11-12 DIAGNOSIS — R53.82 CHRONIC FATIGUE: Primary | ICD-10-CM

## 2024-11-12 DIAGNOSIS — R55 POSTURAL DIZZINESS WITH PRESYNCOPE: ICD-10-CM

## 2024-11-12 DIAGNOSIS — R42 POSTURAL DIZZINESS WITH PRESYNCOPE: ICD-10-CM

## 2024-11-12 RX ORDER — SUMATRIPTAN SUCCINATE 25 MG/1
25 TABLET ORAL
Qty: 90 TABLET | Refills: 0 | Status: SHIPPED | OUTPATIENT
Start: 2024-11-12

## 2024-11-12 ASSESSMENT — ASTHMA QUESTIONNAIRES
QUESTION_1 LAST FOUR WEEKS HOW MUCH OF THE TIME DID YOUR ASTHMA KEEP YOU FROM GETTING AS MUCH DONE AT WORK, SCHOOL OR AT HOME: NONE OF THE TIME
QUESTION_3 LAST FOUR WEEKS HOW OFTEN DID YOUR ASTHMA SYMPTOMS (WHEEZING, COUGHING, SHORTNESS OF BREATH, CHEST TIGHTNESS OR PAIN) WAKE YOU UP AT NIGHT OR EARLIER THAN USUAL IN THE MORNING: NOT AT ALL
ACT_TOTALSCORE: 23
QUESTION_4 LAST FOUR WEEKS HOW OFTEN HAVE YOU USED YOUR RESCUE INHALER OR NEBULIZER MEDICATION (SUCH AS ALBUTEROL): NOT AT ALL
QUESTION_5 LAST FOUR WEEKS HOW WOULD YOU RATE YOUR ASTHMA CONTROL: COMPLETELY CONTROLLED
QUESTION_2 LAST FOUR WEEKS HOW OFTEN HAVE YOU HAD SHORTNESS OF BREATH: THREE TO SIX TIMES A WEEK
ACT_TOTALSCORE: 23

## 2024-11-12 NOTE — PATIENT INSTRUCTIONS
Patient Education   Here is the plan from today's visit    1. Pneumococcal vaccination indicated  - Pneumococcal 20 Valent Conjugate (Prevnar 20)    2. Chronic fatigue (Primary)  Negative/normal work up for anemia, thyroid dysfunction, infection, vitamin D and B12 deficiency, liver disease. No sign of underlying neurologic problem. Considering atypical heart rhythm as a cause with Zio patch monitor, please wear for 2 weeks. Continue to work on cutting down on marijuana as this could contribute at least somewhat to chronic fatigue. We will do a sleep study to see if obstructive sleep apnea or restless leg syndrome are contributing to fatigue.  - EKG 12-lead complete w/read - Clinics  - Adult Sleep Eval & Management Referral; Future  - ZIO PATCH MAIL OUT; Future    3. Migraine with aura and without status migrainosus, not intractable  Try the sumatriptan medicine as needed for migraine headache to decrease severity and timing of headaches.  - SUMAtriptan (IMITREX) 25 MG tablet; Take 1 tablet (25 mg) by mouth at onset of headache for migraine. May repeat in 2 hours. Max 8 tablets/24 hours.  Dispense: 90 tablet; Refill: 0    4. Postural dizziness with presyncope  Do ziopatch heart monitor for 2 weeks. Be sure to eat and drink regularly to nourish your body.    If you feel dizzy or lightheaded, sit down or lie down for a few minutes. Or you can sit down and put your head between your knees. This will help your blood pressure go back to normal and help your symptoms go away.  Follow your doctor's suggestions for ways to prevent symptoms like dizziness. These suggestions may include:  Get up slowly from bed or after sitting for a long time. If you are in bed, roll to your side and swing your legs over the edge of the bed and onto the floor. Push your body up to a sitting position. Wait for a while before you slowly stand up.  Add more salt to your diet, if your doctor recommends it.  Drink plenty of fluids. Choose water  and other clear liquids. If you have kidney, heart, or liver disease and have to limit fluids, talk with your doctor before you increase the amount of fluids you drink.  Avoid or limit alcohol to 2 drinks a day for men and 1 drink a day for women. Alcohol may interfere with your medicine. In addition, alcohol can make your low blood pressure worse by causing your body to lose water.  Wear compression stockings to help improve blood flow.    Please call or return to clinic if your symptoms don't go away.    Follow up plan  No follow-ups on file.    Thank you for coming to [facility] today.  Lab Testing:  **If you had lab testing today and your results are reassuring or normal they will be mailed to you or sent through depict within 7 days.   **If the lab tests need quick action we will call you with the results.  **If you are having labs done on a different day, please call [phone] to schedule at [facility] or [phone] for other [facility] locations. Labs do not offer walk-in appointments.  The phone number we will call with results is # [phone] (home) . If this is not the best number please call our clinic and change the number.  Medication Refills:  If you need any refills please call your pharmacy and they will contact us.   If you need to  your refill at a new pharmacy, please contact the new pharmacy directly. The new pharmacy will help you get your medications transferred faster.   Scheduling:  If you have any concerns about today's visit or wish to schedule another appointment please call our office during normal business hours [phone] (8-5:00 M-F). If you can no longer make a scheduled visit, please cancel via depict or call us to cancel.   If a referral was made to an [facility] specialty provider and you do not get a call from central scheduling, please refer to directions on your visit summary or call our office during normal business hours for  assistance.   If a Mammogram was ordered for you at the [facility] call [phone] to schedule or change your appointment.  If you had an XRay/CT/Ultrasound/MRI ordered the number is [phone] to schedule or change your radiology appointment.   [facility] has limited ultrasound appointments available on Wednesdays, if you would like your ultrasound at [facility], please call [phone] to schedule.   Medical Concerns:  If you have urgent medical concerns please call [phone] at any time of the day.    Mili Haji MD

## 2024-11-12 NOTE — PROGRESS NOTES
Assessment & Plan     Chronic migraine with aura without status migrainosus, not intractable  Description of daily headaches consistent with migraine headache with aura (see Dr. Otero's note 10/30/24). Was prescribed propranolol 40mg BID for prophylaxis which helped decrease headache frequency but also lead to worsening postural dizziness and anxiety attacks.  Given severe side effects opt to discontinue propranolol.  Patient was not able to  sumatriptan due to pharmacy issues, and will reorder today in hopes for having it filled.  Discussed use of sumatriptan as abortive therapy for migraine headaches as needed.  - SUMAtriptan (IMITREX) 25 MG tablet; Take 1 tablet (25 mg) by mouth at onset of headache for migraine. May repeat in 2 hours. Max 8 tablets/24 hours.    Postural dizziness with presyncope  Patient reports intermittent lightheadedness and dizziness with position change and associated vision changes and presyncope often preceded by migraine headache and occasionally accompanied by rapid onset tachycardia.  EKG today reassuring.  No sign of anemia or thyroid abnormalities on recent lab testing.  Considering intermittent arrhythmia such as SVT or paroxysmal atrial fibrillation as etiology of the symptoms.  Patient has otherwise largely normal neurologic exam.  Does not appear to be vasovagal in nature given history.  Consider low-dose verapamil as treatment for tachycardia, migraine, and fatigue if all testing is reassuring.  Recommend getting orthostatic vitals at next visit.   -EKG completed  -Ziopatch x 2 weeks    Chronic fatigue  Probable JORGE  Patient has had upwards of 3 years of chronic daily fatigue limiting daily function is severely. Has no known triggering factors.  Patient has had negative workup for anemia, infection, hypothyroidism, vitamin D deficiency, vitamin B12 deficiency, liver disease.  No signs or symptoms of underlying neurologic disorder.  Chronic fatigue could be due to  "obstructive sleep apnea given history of snoring, fatigue, and body habitus. Also consider restless leg syndrome.  Arrhythmia as above also possible etiology.  Depression and anxiety could be source of fatigue, however, patient has had improved mental health symptoms otherwise and states poor mental health stems from fatigue rather than vice versa. His mental health seems to be well-controlled overall with weekly therapy and psychiatry-managed medicines, including vyvanse and effexor. Considered cortisol dysfunction due to chronic fatigue, postural presyncope, body size, myalgias, and depression, though patient does not have electrolyte abnormalities or glucose intolerance suggestive of adrenal insufficiency or Cushing's syndrome. No sign of substance use as primary cause, though cutting down on marijuana use could help with fatigue. If sleep study and ziopatch do not suggest underlying cause, consider verapamil treatment as above vs screen for adrenal deficiency vs discussion of fibromyalgia as cause.  - EKG 12-lead complete w/read - Clinics  - Adult Sleep Eval & Management Referral; Future    Pneumococcal vaccination indicated  - Pneumococcal 20 Valent Conjugate (Prevnar 20)    BMI  Estimated body mass index is 44.97 kg/m  as calculated from the following:    Height as of this encounter: 1.646 m (5' 4.8\").    Weight as of this encounter: 121.8 kg (268 lb 9.6 oz).     Keep appointment scheduled with Dr. Aguilar 12/4/24.    The longitudinal plan of care for the diagnosis(es)/condition(s) as documented were addressed during this visit. Due to the added complexity in care, I will continue to support [place] in the subsequent management and with ongoing continuity of care.    Chanelle Corral is a 24 year old, presenting for the following health issues:  Follow Up      11/12/2024     1:15 PM   Additional Questions   Roomed by Connor         11/12/2024    Information    services provided? No " "       JUANITA Corral is a 24 year old with PMH of ADHD, mild persistent exercise-induced asthma (well-controlled), hypothyroidism, moderate major depression, PTSD, anxiety, prediabetes, and ASD who presents for 2 week follow-up visit.    Fatigue  2016 had burnout  Back problems and did PT, wore him out and decreased activity with this  Past 3 years chronic daily fatigue  Worsened past year to 6 months  Leads to memory problems, physical symptoms  Unsure what could trigger the worsening of the fatigue  Excited about grad school program, loves job, closer to social network since moving last summer  Testing for MS, Lyme, RA; suspect he has fibromyalgia  Full body pain  Uncomfortable with wearing most clothes  Most concern for chronic fatigue syndrome and fibromyalgia   Treatments: dietary changes by increasing protein, takes vitamin D, weekly bike rides, walks at job  Daily has difficulty with headache, fatigue, presyncope, vision changes  Has postural   Being able to drive is important to him  Would like help with mobility aids  OTC pain medicines, cannabis has not helped and weaning off of this 3x/wk  Smokes marijuana 2-3 times per week, cutting down; does not use tobacco products; drinks alcohol rarely  Migraine headache  Sumatriptan not prescription  Propranolol decreased HR         Review of Systems  Constitutional, HEENT, cardiovascular, pulmonary, gi and gu systems are negative, except as otherwise noted.      Objective    /83   Pulse 114   Temp 98.2  F (36.8  C) (Temporal)   Resp 14   Ht 1.646 m (5' 4.8\")   Wt 121.8 kg (268 lb 9.6 oz)   SpO2 96%   BMI 44.97 kg/m    Body mass index is 44.97 kg/m .  Physical Exam   GENERAL: alert and no distress, fidgeting throughout exam, doing Rubiks cube  NECK: no adenopathy, no asymmetry, masses, or scars  RESP: lungs clear to auscultation - no rales, rhonchi or wheezes  CV: regular rate and rhythm, normal S1 S2, no S3 or S4, no murmur, click or rub, no " peripheral edema  MS: no gross musculoskeletal defects noted, no edema  SKIN: no suspicious lesions or rashes  PSYCH: mentation appears normal, affect restricted/flat, good insight and judgment, poor eye contact    EKG - Reviewed and interpreted by me appears normal, NSR, normal axis, normal intervals, no acute ST/T changes c/w ischemia, no LVH by voltage criteria, there are no prior tracings available  Office Visit on 10/30/2024   Component Date Value Ref Range Status    HIV Antigen Antibody Combo 10/30/2024 Nonreactive  Nonreactive Final    Negative HIV-1 p24 antigen and HIV-1/2 antibody screening test results usually indicate the absence of HIV-1 and HIV-2 infection. However, such negative results do not rule-out acute HIV infection.  If acute HIV-1 or HIV-2 infection is suspected, detection of HIV-1 or HIV-2 RNA  is recommended. This result is obtained using the Roche Elecsys HIV Duo method on the aman e801 immunoassay analyzer.    Hepatitis C Antibody 10/30/2024 Nonreactive  Nonreactive Final    A nonreactive screening test result does not exclude the possibility of exposure to or infection with HCV. Nonreactive screening test results in individuals with prior exposure to HCV may be due to antibody levels below the limit of detection of this assay or lack of reactivity to the HCV antigens used in this assay. Patients with recent HCV infections (<3 months from time of exposure) may have false-negative HCV antibody results due to the time needed for seroconversion (average of 8 to 9 weeks).    Sodium 10/30/2024 139  135 - 145 mmol/L Final    Potassium 10/30/2024 4.7  3.4 - 5.3 mmol/L Final    Carbon Dioxide (CO2) 10/30/2024 26  22 - 29 mmol/L Final    Anion Gap 10/30/2024 12  7 - 15 mmol/L Final    Urea Nitrogen 10/30/2024 7.7  6.0 - 20.0 mg/dL Final    Creatinine 10/30/2024 0.72  0.51 - 1.17 mg/dL Final    Male and Female  0-2 Months    0.31-0.88 mg/dL  2-12 Months   0.16-0.39 mg/dL  1-2 Years     0.18-0.35  "mg/dL  3-4 Years     0.26-0.42 mg/dL  5-6 Years     0.29-0.47 mg/dL  7-8 Years     0.34-0.53 mg/dL  9-10 Years    0.33-0.64 mg/dL  11-12 Years   0.44-0.68 mg/dL  13-14 Years   0.46-0.77 mg/dL    Female  15 Years and older  0.51-0.95 mg/dL    Male  15 Years and older  0.67-1.17 mg/dL        GFR Estimate 10/30/2024 >90  >60 mL/min/1.73m2 Final    The generation of the estimated GFR is currently based on binary male or female sex. If the electronic health record information indicates another gender identity or if Legal Sex is recorded as \"Unknown\", GFR estimates are not automatically calculated, and application of GFR equations or a direct GFR measurement should be considered according to the individual's appropriate clinical context.  eGFR calculated using 2021 CKD-EPI equation.    Calcium 10/30/2024 9.9  8.8 - 10.4 mg/dL Final    Reference intervals for this test were updated on 7/16/2024 to reflect our healthy population more accurately. There may be differences in the flagging of prior results with similar values performed with this method. Those prior results can be interpreted in the context of the updated reference intervals.    Chloride 10/30/2024 101  98 - 107 mmol/L Final    Glucose 10/30/2024 96  70 - 99 mg/dL Final    Alkaline Phosphatase 10/30/2024 58  40 - 150 U/L Final    Female:    0-15 days     U/L  15d-1 year   122-469 U/L  1-10 years   142-335 U/L  10-13 years  129-417 U/L  13-15 years   U/L  15-17 years   U/L  17-19 years  45-87 U/L  19 years and older   U/L      Male:  0-15 days     U/L  15d-1 year   122-469 U/L  1-10 years   142-335 U/L  10-13 years  129-417 U/L  13-15 years  116-468 U/L  15-17 years   U/L  17-19 years   U/L  19 years and older   U/L      AST 10/30/2024 35  0 - 45 U/L Final    ALT 10/30/2024 59  0 - 70 U/L Final    Female   All ages       0-50 U/L     Male   0-20 Years     0-50 U/L  20-Unsp. Years 0-70 U/L        Protein Total " 10/30/2024 7.6  6.4 - 8.3 g/dL Final    Albumin 10/30/2024 4.8  3.5 - 5.2 g/dL Final    Bilirubin Total 10/30/2024 0.3  <=1.2 mg/dL Final    TSH 10/30/2024 1.30  0.30 - 4.20 uIU/mL Final    Vitamin D, Total (25-Hydroxy) 10/30/2024 56 (H)  20 - 50 ng/mL Final    indicates supplementation, with increased risk of hypercalciuria    Vitamin B12 10/30/2024 464  232 - 1,245 pg/mL Final    WBC Count 10/30/2024 8.3  4.0 - 11.0 10e3/uL Final    RBC Count 10/30/2024 5.44  3.80 - 5.90 10e6/uL Final    Reference Range:                                                     Female 3.80-5.20 10e6/uL                                      Male 4.40-5.90 10e6u/L    Hemoglobin 10/30/2024 14.8  11.7 - 17.7 g/dL Final    Reference Range:                                                     Female 11.7-15.7 g/dL                                      Male 13.3-17.7 g/dL    Hematocrit 10/30/2024 46.9  35.0 - 53.0 % Final    Sex Specific Reference Ranges: Female  35.0-47.0 % Male  40.0-53.0 %    MCV 10/30/2024 86  78 - 100 fL Final    MCH 10/30/2024 27.2  26.5 - 33.0 pg Final    MCHC 10/30/2024 31.6  31.5 - 36.5 g/dL Final    RDW 10/30/2024 13.2  10.0 - 15.0 % Final    Platelet Count 10/30/2024 314  150 - 450 10e3/uL Final           Signed Electronically by: Mili Haji MD

## 2024-11-16 ENCOUNTER — HEALTH MAINTENANCE LETTER (OUTPATIENT)
Age: 24
End: 2024-11-16

## 2024-11-16 ENCOUNTER — MYC REFILL (OUTPATIENT)
Dept: FAMILY MEDICINE | Facility: CLINIC | Age: 24
End: 2024-11-16
Payer: COMMERCIAL

## 2024-11-16 DIAGNOSIS — F64.9 GENDER DYSPHORIA: Primary | ICD-10-CM

## 2024-11-18 NOTE — TELEPHONE ENCOUNTER
Appear to be outside meds that pt has not yet discussed w/ Mercedes's provider since establishing care. Will send message.

## 2024-11-18 NOTE — TELEPHONE ENCOUNTER
"Request for medication refill:  finasteride (PROPECIA) 1 MG tablet     TESTOSTERONE CYPIONATE IM     Providers if patient needs an appointment and you are willing to give a one month supply please refill for one month and  send a letter/MyChart using \".SMILLIMITEDREFILL\" .smillimited and route chart to \"[facility] \" (Giving one month refill in non controlled medications is strongly recommended before denial)    If refill has been denied, meaning absolutely no refills without visit, please complete the smart phrase \".smirxrefuse\" and route it to the \"[facility] MED REFILLS\"  pool to inform the patient and the pharmacy.    Elver Rivas, CMA      "

## 2024-11-19 RX ORDER — FINASTERIDE 1 MG/1
1 TABLET, FILM COATED ORAL DAILY
Qty: 90 TABLET | Refills: 3 | Status: SHIPPED | OUTPATIENT
Start: 2024-11-19

## 2024-11-19 RX ORDER — TESTOSTERONE CYPIONATE 200 MG/ML
40 INJECTION, SOLUTION INTRAMUSCULAR WEEKLY
Qty: 4 ML | Refills: 1 | Status: SHIPPED | OUTPATIENT
Start: 2024-11-19

## 2024-12-03 PROBLEM — M47.816 FACET ARTHROPATHY, LUMBAR: Status: ACTIVE | Noted: 2023-11-06

## 2024-12-03 RX ORDER — LISDEXAMFETAMINE DIMESYLATE 70 MG/1
70 CAPSULE ORAL EVERY MORNING
COMMUNITY
Start: 2024-10-16

## 2024-12-03 NOTE — PROGRESS NOTES
Assessment & Plan     Postural dizziness with presyncope  Tachycardia  Suspect dx of POTS. [employer] lean test positive for tachycardia and sx of orthostatic intolerance w/o hypotension - discussed limitations of testing while pt taking SNRI and high dose stimulant; pt has been on stimulant for years and it improves function substantially so not an accessible option to repeat testing without it. Also reports in the past they had normal HR despite being on stimulant. Propranolol was somewhat helpful but had significant rebound sx with IR formulation. Will complete structural cardiac workup and anticipate starting medication treatment.   - Zio Patch returned today, will review results   - Echocardiogram Complete; Future  - would consider trialing beta blocker like metoprolol XR 25mg daily to start. If not tolerating beta blocker next could consider clonidine 0.1mg or guanfacine 0.5mg BID      Chronic fatigue  Chronic pain   Did not discuss at length today. Does have features that raise suspicion for ME/CFS and/or fibromyalgia but did not delve into diagnostic criteria. Reports lab workup in 11/11/24 scanned in summary document ruling out RA, other autoimmune but would consider further thorough lab testing to rule out alternate cause given degree of disability currently. Likely would benefit from multiprong approach including medications.   - sleep med consult is scheduled for late winter     Recommend considering/offering labs at next visit:   - ferritin  - AM cortisol   - ESR/CRP    Impaired mobility and ADLs  Significantly impaired by post-exertional malaise and fatigue, joint pain, and difficulty with ambulation. Will pursue getting mobility aid to help w/ getting around in the community.   - Wheelchair Scooter Order; Future    Dyspnea  Substantial component of above sx. May benefit from formal PFTs depending on symptom evolution/improvement.     Gender dysphoria  Unclear why previous script didn't go through. Still  "having breakthrough menses. May need to increase T dose if not in a bone-safe range   - testosterone cypionate (DEPOTESTOSTERONE) 200 MG/ML injection; Inject 0.2 mLs (40 mg) into the muscle once a week.  - Estradiol; Future  - Testosterone total; Future  - Estradiol  - Testosterone total    Hair loss  Not seeing improvement w/ finasteride. Would like to try minoxicil   - minoxidil (LONITEN) 2.5 MG tablet; Take 1 tablet (2.5 mg) by mouth daily.    Major depressive disorder with current active episode, unspecified depression episode severity, unspecified whether recurrent  Attention deficit hyperactivity disorder (ADHD), combined type  Follows w Psychiatry       BMI  Estimated body mass index is 45.04 kg/m  as calculated from the following:    Height as of this encounter: 1.646 m (5' 4.8\").    Weight as of this encounter: 122 kg (269 lb).       Depression Screening Follow Up        12/4/2024     9:31 AM   PHQ   PHQ-9 Total Score 15   Q9: Thoughts of better off dead/self-harm past 2 weeks Several days     Follow Up Actions Taken  Referred patient back to mental health provider       Return in about 1 month (around 1/4/2025) for Follow up with Dr. Aguilar or Dr. Haji .      78 minutes spent by me on the date of the encounter doing chart review, history and exam, documentation and further activities per the note    Subjective   Ellis is a 24 year old transmasc adult, presenting for the following health issues:  RECHECK    HPI     Recent visit review:    Postural dizziness: sent w/ Josselyn      Chronic fatigue: >3y of sx. Thorough lab workup.     ++++++++++++++++++++++++  Testosterone probem   Hair loss: topical vs oral minoxidil     Just finished the heart monitor   Did get a sleep study consult scheduled for March, they put him on a waiting list, had a hx of sleep apnea and then had tonsils and adenoids removal as a kid   -- does still think that he has ME/CFS and/or fibromyalgia   -- is at a point where " they can do about 20% of the activities they need to do to care for themself. Still exerting quite a bit of effort.   - can consistently prepare 1 meal a day, sometimes 2. Switched to plastic utensils bc very hard to wash dishes.   - lumps driving, tries to avoid driving extra   - in grad school rn, not sure if they will continue. Considering DES. One fainting spell away from not showing up to job anymore. Makes them sad bc they dont' like their job. Grad program is online through [employer]. Not that much extra work. 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     Sx worse after exerting at work - lighteaded, headache passy outy   Feels like falling asleep really fast  Will feel short of breath. Has been checking their HR - has a spreadsheet. Every time they would check their heart rate, lived anywhere from 90 to 120-130.     Propranolol tried for HA. Seemed to help symptoms somewhat but then worse off and had a panic attack. Lightheadedness was minimal. Wore off too quickly.     Changes:   Has been trying to eat more protein   Has a water bottle they bring w/ them everywhere, doesn't necessarily drink all of it but tries     Did send some documents for accommodations - not sure what all they could look like. Started filling some of them out.     Impaired mobility:   - Would help them be able to preserve energy to accomplish other ADLs like eating, shower, clean home, class  - right now without scooter: unsteadiness on feet, hip and lower back pain, stiffness, weakness in lower extremities, post-exertional fatigue     Arthritis - underlying bulging discs     Moved from [place] this summer     ++++++++++++++++++++++++    Restarted testosterone in March   Hasn't had levels checked. Had a break  "where he wasn't on it for a while.   On a low dose, still gets an irreg menstrual cycle. Still typically once a month   Injection day is Sunday     Took Vyvanse today   Skips vyvanse very rarely   Has been on stimulant since 2016           Objective    /87   Pulse 110   Temp 99  F (37.2  C) (Oral)   Resp 24   Ht 1.646 m (5' 4.8\")   Wt 122 kg (269 lb)   SpO2 97%   BMI 45.04 kg/m    Body mass index is 45.04 kg/m .  Physical Exam   GENERAL: alert and no distress. Appears fatigued.   RESP: lungs clear to auscultation - no rales, rhonchi or wheezes  CV: regular rate and rhythm, normal S1 S2, no S3 or S4, no murmur, click or rub  MS: no gross musculoskeletal defects noted, no edema  PSYCH: restricted affect. Appears tired.       NASA LEAN TEST    SBP DBP HR Pulse Pressure (SBP-DBP) Comments/Symptoms   Supine 1st 133 85 104     Supine 2nd                Standing 1 min 135 92 133  Wave of lightheadedness w/ standing    Standing 2 min 134 88 135  Fatigue, still lightheaded   Standing 3 min 126 84 136     Standing 4 min 133 83 137  \"I feel very exhausted. I could just sit down.\" Feeling short of breath.    Standing 5 min 119 89 140     Standing 7 min 137 98 143       Discontinued early due to symptom severity        Signed Electronically by: Nohemi Aguilar DO  DME (Durable Medical Equipment) Orders and Documentation  No orders of the defined types were placed in this encounter.     The patient was assessed and it was determined the patient is in need of the following listed DME Supplies/Equipment. Please complete supporting documentation below to demonstrate medical necessity.         "

## 2024-12-04 ENCOUNTER — OFFICE VISIT (OUTPATIENT)
Dept: FAMILY MEDICINE | Facility: CLINIC | Age: 24
End: 2024-12-04
Payer: COMMERCIAL

## 2024-12-04 VITALS
HEART RATE: 110 BPM | WEIGHT: 269 LBS | SYSTOLIC BLOOD PRESSURE: 134 MMHG | RESPIRATION RATE: 24 BRPM | HEIGHT: 65 IN | DIASTOLIC BLOOD PRESSURE: 87 MMHG | OXYGEN SATURATION: 97 % | TEMPERATURE: 99 F | BODY MASS INDEX: 44.82 KG/M2

## 2024-12-04 DIAGNOSIS — R00.0 TACHYCARDIA: ICD-10-CM

## 2024-12-04 DIAGNOSIS — F32.9 MAJOR DEPRESSIVE DISORDER WITH CURRENT ACTIVE EPISODE, UNSPECIFIED DEPRESSION EPISODE SEVERITY, UNSPECIFIED WHETHER RECURRENT: ICD-10-CM

## 2024-12-04 DIAGNOSIS — Z78.9 IMPAIRED MOBILITY AND ADLS: ICD-10-CM

## 2024-12-04 DIAGNOSIS — R42 POSTURAL DIZZINESS WITH PRESYNCOPE: ICD-10-CM

## 2024-12-04 DIAGNOSIS — R53.82 CHRONIC FATIGUE: ICD-10-CM

## 2024-12-04 DIAGNOSIS — Z74.09 IMPAIRED MOBILITY AND ADLS: ICD-10-CM

## 2024-12-04 DIAGNOSIS — F64.9 GENDER DYSPHORIA: Primary | ICD-10-CM

## 2024-12-04 DIAGNOSIS — R55 POSTURAL DIZZINESS WITH PRESYNCOPE: ICD-10-CM

## 2024-12-04 DIAGNOSIS — L65.9 HAIR LOSS: ICD-10-CM

## 2024-12-04 DIAGNOSIS — F90.2 ATTENTION DEFICIT HYPERACTIVITY DISORDER (ADHD), COMBINED TYPE: ICD-10-CM

## 2024-12-04 LAB — ESTRADIOL SERPL-MCNC: 70 PG/ML

## 2024-12-04 RX ORDER — TESTOSTERONE CYPIONATE 200 MG/ML
40 INJECTION, SOLUTION INTRAMUSCULAR WEEKLY
Qty: 4 ML | Refills: 4 | Status: SHIPPED | OUTPATIENT
Start: 2024-12-04

## 2024-12-04 RX ORDER — MINOXIDIL 2.5 MG/1
2.5 TABLET ORAL DAILY
Qty: 90 TABLET | Refills: 3 | Status: SHIPPED | OUTPATIENT
Start: 2024-12-04

## 2024-12-04 ASSESSMENT — PATIENT HEALTH QUESTIONNAIRE - PHQ9: SUM OF ALL RESPONSES TO PHQ QUESTIONS 1-9: 15

## 2024-12-04 NOTE — PATIENT INSTRUCTIONS
Goal is visits every month with Dr. Aguilar or Dr. Haji     Plan from today's visit:   - echocardiogram to evaluate heart function   - we did NASA Lean test today which showed persistent postural tachycardia which suggests POTS. It's hard to say with certainty without holding your stimulant since that can affect heart rate.     Once I see heart monitor and echocardiogram results, I'll send a message and we should discuss starting a medication - maybe a longer acting beta blocker similar to propranolol.     - testosterone and estradiol labs   - for hair loss: stop finasteride. Start minoxidil pills once daily. Use the GoodRx coupon.     For mobility: DME order for electric scooter sent to [facility]. Referral to PT to evaluate you for the scooter - someone should call to schedule.

## 2024-12-04 NOTE — Clinical Note
You're getting roped into comanaging multiple patients w/ me! This one is harder, sorry. They really liked you and I think they should be seen more often, like monthly, for a while. I have unintentionally amassed a population of younger patients w/ hEDS, POTS, or ME/CFS or some combination thereof and still struggle to feel smart but have been forced to learn. If it's useful to you, I've found this website w/ clinical resource links to be useful: [email or web address]

## 2024-12-09 ENCOUNTER — TELEPHONE (OUTPATIENT)
Dept: FAMILY MEDICINE | Facility: CLINIC | Age: 24
End: 2024-12-09

## 2024-12-09 LAB — CV ZIO PRELIM RESULTS: NORMAL

## 2024-12-09 NOTE — TELEPHONE ENCOUNTER
Prior Authorization Retail Medication Request    Medication/Dose: Prior Auth- testosterone cypionate (DEPOTESTOSTERONE) 200 MG/ML injection  Diagnosis and ICD code (if different than what is on RX):    New/renewal/insurance change PA/secondary ins. PA:  Previously Tried and Failed:    Rationale:      Insurance   Primary: BLUE PLUS   Insurance ID:  [id number]     Secondary (if applicable):  Insurance ID:      Pharmacy Information (if different than what is on RX)  Name:    Phone:    Fax:    Clinic Information  Preferred routing pool for dept communication: orange

## 2024-12-11 NOTE — TELEPHONE ENCOUNTER
Central Prior Authorization Team - Phone: [phone]     PA Initiation    Medication: TESTOSTERONE CYPIONATE 200 MG/ML IM SOLN  Insurance Company: [employer] - Phone [phone] Fax [phone]  Pharmacy Filling the Rx: [id number] [place], [place] - [address] [place] [address] W  Filling Pharmacy Phone: [phone]  Filling Pharmacy Fax:    Start Date: 12/11/2024

## 2024-12-13 NOTE — TELEPHONE ENCOUNTER
Central Prior Authorization Team - Phone: [phone]     Prior Authorization Approval    Medication: TESTOSTERONE CYPIONATE 200 MG/ML IM SOLN  Authorization Effective Date: 12/11/2024  Authorization Expiration Date: 12/11/2027  Approved Dose/Quantity: 4 ML  Reference #:     Insurance Company: [employer] - Phone [phone] Fax [phone]  Expected CoPay: $    CoPay Card Available:      Financial Assistance Needed:   Which Pharmacy is filling the prescription: [facility] [id number] [place] [address]  Pharmacy Notified: YES  Patient Notified: YES Narrative message sent and Instructed pharmacy to notify patient once order is ready.

## 2025-01-10 ENCOUNTER — APPOINTMENT (OUTPATIENT)
Dept: CT IMAGING | Facility: CLINIC | Age: 25
End: 2025-01-10
Attending: EMERGENCY MEDICINE
Payer: COMMERCIAL

## 2025-01-10 ENCOUNTER — HOSPITAL ENCOUNTER (INPATIENT)
Facility: CLINIC | Age: 25
LOS: 1 days | Discharge: HOME | End: 2025-01-13
Attending: EMERGENCY MEDICINE | Admitting: STUDENT IN AN ORGANIZED HEALTH CARE EDUCATION/TRAINING PROGRAM
Payer: COMMERCIAL

## 2025-01-10 DIAGNOSIS — E86.0 DEHYDRATION: ICD-10-CM

## 2025-01-10 DIAGNOSIS — F32.9 MAJOR DEPRESSION: ICD-10-CM

## 2025-01-10 DIAGNOSIS — R10.84 GENERALIZED ABDOMINAL PAIN: ICD-10-CM

## 2025-01-10 DIAGNOSIS — K59.00 CONSTIPATION: ICD-10-CM

## 2025-01-10 DIAGNOSIS — R53.81 PHYSICAL DECONDITIONING: ICD-10-CM

## 2025-01-10 DIAGNOSIS — F90.2 ATTENTION DEFICIT HYPERACTIVITY DISORDER (ADHD), COMBINED TYPE: ICD-10-CM

## 2025-01-10 DIAGNOSIS — F43.10 PTSD (POST-TRAUMATIC STRESS DISORDER): Primary | ICD-10-CM

## 2025-01-10 DIAGNOSIS — R11.2 NAUSEA AND VOMITING, UNSPECIFIED VOMITING TYPE: ICD-10-CM

## 2025-01-10 DIAGNOSIS — R04.0 EPISTAXIS: ICD-10-CM

## 2025-01-10 DIAGNOSIS — E66.01 MORBID OBESITY (H): ICD-10-CM

## 2025-01-10 DIAGNOSIS — F84.0 AUTISM SPECTRUM: ICD-10-CM

## 2025-01-10 DIAGNOSIS — E55.9 VITAMIN D DEFICIENCY: ICD-10-CM

## 2025-01-10 LAB
ALBUMIN SERPL BCG-MCNC: 4.9 G/DL (ref 3.5–5.2)
ALBUMIN UR-MCNC: 20 MG/DL
ALP SERPL-CCNC: 66 U/L (ref 40–150)
ALT SERPL W P-5'-P-CCNC: 55 U/L (ref 0–70)
ANION GAP SERPL CALCULATED.3IONS-SCNC: 12 MMOL/L (ref 7–15)
APPEARANCE UR: CLEAR
AST SERPL W P-5'-P-CCNC: 31 U/L (ref 0–45)
BASOPHILS # BLD AUTO: 0 10E3/UL (ref 0–0.2)
BASOPHILS NFR BLD AUTO: 0 %
BILIRUB SERPL-MCNC: 0.5 MG/DL
BILIRUB UR QL STRIP: NEGATIVE
BUN SERPL-MCNC: 11.7 MG/DL (ref 6–20)
CALCIUM SERPL-MCNC: 9.8 MG/DL (ref 8.8–10.4)
CHLORIDE SERPL-SCNC: 101 MMOL/L (ref 98–107)
COLOR UR AUTO: YELLOW
CREAT SERPL-MCNC: 0.69 MG/DL (ref 0.51–1.17)
EGFRCR SERPLBLD CKD-EPI 2021: >90 ML/MIN/1.73M2
EOSINOPHIL # BLD AUTO: 0.1 10E3/UL (ref 0–0.7)
EOSINOPHIL NFR BLD AUTO: 1 %
ERYTHROCYTE [DISTWIDTH] IN BLOOD BY AUTOMATED COUNT: 13.2 % (ref 10–15)
EST. AVERAGE GLUCOSE BLD GHB EST-MCNC: 134 MG/DL
FLUAV RNA SPEC QL NAA+PROBE: NEGATIVE
FLUBV RNA RESP QL NAA+PROBE: NEGATIVE
GLUCOSE SERPL-MCNC: 148 MG/DL (ref 70–99)
GLUCOSE UR STRIP-MCNC: NEGATIVE MG/DL
HBA1C MFR BLD: 6.3 %
HCG INTACT+B SERPL-ACNC: <1 MIU/ML
HCO3 SERPL-SCNC: 27 MMOL/L (ref 22–29)
HCT VFR BLD AUTO: 51 % (ref 35–53)
HGB BLD-MCNC: 16 G/DL (ref 11.7–17.7)
HGB UR QL STRIP: NEGATIVE
IMM GRANULOCYTES # BLD: 0.1 10E3/UL
IMM GRANULOCYTES NFR BLD: 1 %
KETONES UR STRIP-MCNC: NEGATIVE MG/DL
LEUKOCYTE ESTERASE UR QL STRIP: ABNORMAL
LIPASE SERPL-CCNC: 19 U/L (ref 13–60)
LYMPHOCYTES # BLD AUTO: 0.6 10E3/UL (ref 0.8–5.3)
LYMPHOCYTES NFR BLD AUTO: 4 %
MAGNESIUM SERPL-MCNC: 2 MG/DL (ref 1.7–2.3)
MCH RBC QN AUTO: 27.2 PG (ref 26.5–33)
MCHC RBC AUTO-ENTMCNC: 31.4 G/DL (ref 31.5–36.5)
MCV RBC AUTO: 87 FL (ref 78–100)
MONOCYTES # BLD AUTO: 0.6 10E3/UL (ref 0–1.3)
MONOCYTES NFR BLD AUTO: 4 %
MUCOUS THREADS #/AREA URNS LPF: PRESENT /LPF
NEUTROPHILS # BLD AUTO: 13.1 10E3/UL (ref 1.6–8.3)
NEUTROPHILS NFR BLD AUTO: 91 %
NITRATE UR QL: NEGATIVE
NRBC # BLD AUTO: 0 10E3/UL
NRBC BLD AUTO-RTO: 0 /100
PH UR STRIP: 7.5 [PH] (ref 5–7)
PLATELET # BLD AUTO: 312 10E3/UL (ref 150–450)
POTASSIUM SERPL-SCNC: 4.3 MMOL/L (ref 3.4–5.3)
PROT SERPL-MCNC: 7.9 G/DL (ref 6.4–8.3)
RBC # BLD AUTO: 5.89 10E6/UL (ref 3.8–5.9)
RBC URINE: 3 /HPF
RSV RNA SPEC NAA+PROBE: NEGATIVE
SARS-COV-2 RNA RESP QL NAA+PROBE: NEGATIVE
SODIUM SERPL-SCNC: 140 MMOL/L (ref 135–145)
SP GR UR STRIP: 1.01 (ref 1–1.03)
SQUAMOUS EPITHELIAL: 1 /HPF
TRANSITIONAL EPI: <1 /HPF
UROBILINOGEN UR STRIP-MCNC: NORMAL MG/DL
WBC # BLD AUTO: 14.5 10E3/UL (ref 4–11)
WBC URINE: 6 /HPF

## 2025-01-10 PROCEDURE — 87637 SARSCOV2&INF A&B&RSV AMP PRB: CPT | Performed by: EMERGENCY MEDICINE

## 2025-01-10 PROCEDURE — 80053 COMPREHEN METABOLIC PANEL: CPT | Performed by: EMERGENCY MEDICINE

## 2025-01-10 PROCEDURE — 96361 HYDRATE IV INFUSION ADD-ON: CPT | Performed by: EMERGENCY MEDICINE

## 2025-01-10 PROCEDURE — 250N000009 HC RX 250: Performed by: EMERGENCY MEDICINE

## 2025-01-10 PROCEDURE — 250N000013 HC RX MED GY IP 250 OP 250 PS 637: Performed by: EMERGENCY MEDICINE

## 2025-01-10 PROCEDURE — 83036 HEMOGLOBIN GLYCOSYLATED A1C: CPT

## 2025-01-10 PROCEDURE — 36415 COLL VENOUS BLD VENIPUNCTURE: CPT | Performed by: EMERGENCY MEDICINE

## 2025-01-10 PROCEDURE — 96374 THER/PROPH/DIAG INJ IV PUSH: CPT | Performed by: EMERGENCY MEDICINE

## 2025-01-10 PROCEDURE — 99207 PR APP CREDIT; MD BILLING SHARED VISIT: CPT | Mod: FS

## 2025-01-10 PROCEDURE — 258N000003 HC RX IP 258 OP 636: Performed by: FAMILY MEDICINE

## 2025-01-10 PROCEDURE — 81003 URINALYSIS AUTO W/O SCOPE: CPT | Performed by: EMERGENCY MEDICINE

## 2025-01-10 PROCEDURE — 96375 TX/PRO/DX INJ NEW DRUG ADDON: CPT | Performed by: EMERGENCY MEDICINE

## 2025-01-10 PROCEDURE — 258N000003 HC RX IP 258 OP 636: Performed by: EMERGENCY MEDICINE

## 2025-01-10 PROCEDURE — 96376 TX/PRO/DX INJ SAME DRUG ADON: CPT

## 2025-01-10 PROCEDURE — 250N000011 HC RX IP 250 OP 636

## 2025-01-10 PROCEDURE — 74177 CT ABD & PELVIS W/CONTRAST: CPT

## 2025-01-10 PROCEDURE — 250N000011 HC RX IP 250 OP 636: Performed by: EMERGENCY MEDICINE

## 2025-01-10 PROCEDURE — 85025 COMPLETE CBC W/AUTO DIFF WBC: CPT | Performed by: EMERGENCY MEDICINE

## 2025-01-10 PROCEDURE — 250N000013 HC RX MED GY IP 250 OP 250 PS 637

## 2025-01-10 PROCEDURE — 84702 CHORIONIC GONADOTROPIN TEST: CPT | Performed by: EMERGENCY MEDICINE

## 2025-01-10 PROCEDURE — 99285 EMERGENCY DEPT VISIT HI MDM: CPT | Mod: 25 | Performed by: EMERGENCY MEDICINE

## 2025-01-10 PROCEDURE — 85014 HEMATOCRIT: CPT | Performed by: EMERGENCY MEDICINE

## 2025-01-10 PROCEDURE — 83690 ASSAY OF LIPASE: CPT | Performed by: EMERGENCY MEDICINE

## 2025-01-10 PROCEDURE — G0378 HOSPITAL OBSERVATION PER HR: HCPCS

## 2025-01-10 PROCEDURE — 99285 EMERGENCY DEPT VISIT HI MDM: CPT | Performed by: EMERGENCY MEDICINE

## 2025-01-10 PROCEDURE — 74177 CT ABD & PELVIS W/CONTRAST: CPT | Mod: 26 | Performed by: RADIOLOGY

## 2025-01-10 PROCEDURE — 250N000011 HC RX IP 250 OP 636: Performed by: FAMILY MEDICINE

## 2025-01-10 PROCEDURE — 99222 1ST HOSP IP/OBS MODERATE 55: CPT | Mod: FS | Performed by: STUDENT IN AN ORGANIZED HEALTH CARE EDUCATION/TRAINING PROGRAM

## 2025-01-10 PROCEDURE — 83735 ASSAY OF MAGNESIUM: CPT

## 2025-01-10 PROCEDURE — 96376 TX/PRO/DX INJ SAME DRUG ADON: CPT | Performed by: EMERGENCY MEDICINE

## 2025-01-10 RX ORDER — ONDANSETRON 2 MG/ML
4 INJECTION INTRAMUSCULAR; INTRAVENOUS ONCE
Status: COMPLETED | OUTPATIENT
Start: 2025-01-10 | End: 2025-01-10

## 2025-01-10 RX ORDER — ONDANSETRON 4 MG/1
4 TABLET, ORALLY DISINTEGRATING ORAL EVERY 6 HOURS PRN
Status: DISCONTINUED | OUTPATIENT
Start: 2025-01-10 | End: 2025-01-13 | Stop reason: HOSPADM

## 2025-01-10 RX ORDER — POLYETHYLENE GLYCOL 3350 17 G/17G
17 POWDER, FOR SOLUTION ORAL 2 TIMES DAILY PRN
Status: DISCONTINUED | OUTPATIENT
Start: 2025-01-10 | End: 2025-01-13 | Stop reason: HOSPADM

## 2025-01-10 RX ORDER — PROPRANOLOL HYDROCHLORIDE 40 MG/1
40 TABLET ORAL 2 TIMES DAILY
Status: DISCONTINUED | OUTPATIENT
Start: 2025-01-10 | End: 2025-01-13 | Stop reason: HOSPADM

## 2025-01-10 RX ORDER — ONDANSETRON 4 MG/1
4 TABLET, ORALLY DISINTEGRATING ORAL EVERY 8 HOURS PRN
Qty: 10 TABLET | Refills: 0 | Status: SHIPPED | OUTPATIENT
Start: 2025-01-10 | End: 2025-01-13

## 2025-01-10 RX ORDER — LEVOTHYROXINE SODIUM 25 UG/1
50 TABLET ORAL DAILY
Status: DISCONTINUED | OUTPATIENT
Start: 2025-01-11 | End: 2025-01-13 | Stop reason: HOSPADM

## 2025-01-10 RX ORDER — ALBUTEROL SULFATE 0.83 MG/ML
1.25 SOLUTION RESPIRATORY (INHALATION) 3 TIMES DAILY PRN
Status: DISCONTINUED | OUTPATIENT
Start: 2025-01-10 | End: 2025-01-13 | Stop reason: HOSPADM

## 2025-01-10 RX ORDER — MINOXIDIL 2.5 MG/1
2.5 TABLET ORAL DAILY
Status: DISCONTINUED | OUTPATIENT
Start: 2025-01-11 | End: 2025-01-13 | Stop reason: HOSPADM

## 2025-01-10 RX ORDER — ACETAMINOPHEN 650 MG/1
650 SUPPOSITORY RECTAL EVERY 4 HOURS PRN
Status: DISCONTINUED | OUTPATIENT
Start: 2025-01-10 | End: 2025-01-13 | Stop reason: HOSPADM

## 2025-01-10 RX ORDER — AMOXICILLIN 250 MG
2 CAPSULE ORAL 2 TIMES DAILY PRN
Status: DISCONTINUED | OUTPATIENT
Start: 2025-01-10 | End: 2025-01-13 | Stop reason: HOSPADM

## 2025-01-10 RX ORDER — TESTOSTERONE CYPIONATE 200 MG/ML
40 INJECTION, SOLUTION INTRAMUSCULAR WEEKLY
Status: DISCONTINUED | OUTPATIENT
Start: 2025-01-10 | End: 2025-01-10

## 2025-01-10 RX ORDER — ACETAMINOPHEN 500 MG
1000 TABLET ORAL ONCE
Status: COMPLETED | OUTPATIENT
Start: 2025-01-10 | End: 2025-01-10

## 2025-01-10 RX ORDER — PROCHLORPERAZINE MALEATE 10 MG
10 TABLET ORAL EVERY 6 HOURS PRN
Status: DISCONTINUED | OUTPATIENT
Start: 2025-01-10 | End: 2025-01-13 | Stop reason: HOSPADM

## 2025-01-10 RX ORDER — ACETAMINOPHEN 325 MG/1
650 TABLET ORAL EVERY 4 HOURS PRN
Status: DISCONTINUED | OUTPATIENT
Start: 2025-01-10 | End: 2025-01-13 | Stop reason: HOSPADM

## 2025-01-10 RX ORDER — VENLAFAXINE HYDROCHLORIDE 75 MG/1
75 CAPSULE, EXTENDED RELEASE ORAL DAILY
Status: DISCONTINUED | OUTPATIENT
Start: 2025-01-11 | End: 2025-01-13 | Stop reason: HOSPADM

## 2025-01-10 RX ORDER — LISDEXAMFETAMINE DIMESYLATE 70 MG/1
70 CAPSULE ORAL EVERY MORNING
Status: DISCONTINUED | OUTPATIENT
Start: 2025-01-11 | End: 2025-01-13

## 2025-01-10 RX ORDER — ONDANSETRON 2 MG/ML
4 INJECTION INTRAMUSCULAR; INTRAVENOUS EVERY 6 HOURS PRN
Status: DISCONTINUED | OUTPATIENT
Start: 2025-01-10 | End: 2025-01-13 | Stop reason: HOSPADM

## 2025-01-10 RX ORDER — HYDROMORPHONE HYDROCHLORIDE 1 MG/ML
0.5 INJECTION, SOLUTION INTRAMUSCULAR; INTRAVENOUS; SUBCUTANEOUS ONCE
Status: COMPLETED | OUTPATIENT
Start: 2025-01-10 | End: 2025-01-10

## 2025-01-10 RX ORDER — AMOXICILLIN 250 MG
1 CAPSULE ORAL 2 TIMES DAILY PRN
Status: DISCONTINUED | OUTPATIENT
Start: 2025-01-10 | End: 2025-01-13 | Stop reason: HOSPADM

## 2025-01-10 RX ORDER — SODIUM CHLORIDE, SODIUM LACTATE, POTASSIUM CHLORIDE, CALCIUM CHLORIDE 600; 310; 30; 20 MG/100ML; MG/100ML; MG/100ML; MG/100ML
1000 INJECTION, SOLUTION INTRAVENOUS CONTINUOUS
Status: DISCONTINUED | OUTPATIENT
Start: 2025-01-10 | End: 2025-01-13

## 2025-01-10 RX ORDER — IOPAMIDOL 755 MG/ML
135 INJECTION, SOLUTION INTRAVASCULAR ONCE
Status: COMPLETED | OUTPATIENT
Start: 2025-01-10 | End: 2025-01-10

## 2025-01-10 RX ORDER — SUMATRIPTAN SUCCINATE 25 MG/1
25 TABLET ORAL
Status: DISCONTINUED | OUTPATIENT
Start: 2025-01-10 | End: 2025-01-13 | Stop reason: HOSPADM

## 2025-01-10 RX ADMIN — ONDANSETRON 4 MG: 2 INJECTION INTRAMUSCULAR; INTRAVENOUS at 11:58

## 2025-01-10 RX ADMIN — ONDANSETRON 4 MG: 2 INJECTION INTRAMUSCULAR; INTRAVENOUS at 20:12

## 2025-01-10 RX ADMIN — SODIUM CHLORIDE 84 ML: 9 INJECTION, SOLUTION INTRAVENOUS at 14:28

## 2025-01-10 RX ADMIN — ACETAMINOPHEN 650 MG: 325 TABLET, FILM COATED ORAL at 20:11

## 2025-01-10 RX ADMIN — IOPAMIDOL 135 ML: 755 INJECTION, SOLUTION INTRAVENOUS at 14:27

## 2025-01-10 RX ADMIN — SODIUM CHLORIDE, POTASSIUM CHLORIDE, SODIUM LACTATE AND CALCIUM CHLORIDE 1000 ML: 600; 310; 30; 20 INJECTION, SOLUTION INTRAVENOUS at 18:21

## 2025-01-10 RX ADMIN — PROPRANOLOL HYDROCHLORIDE 40 MG: 40 TABLET ORAL at 21:33

## 2025-01-10 RX ADMIN — ONDANSETRON 4 MG: 2 INJECTION INTRAMUSCULAR; INTRAVENOUS at 17:16

## 2025-01-10 RX ADMIN — SODIUM CHLORIDE 500 ML: 9 INJECTION, SOLUTION INTRAVENOUS at 11:57

## 2025-01-10 RX ADMIN — ONDANSETRON 4 MG: 2 INJECTION INTRAMUSCULAR; INTRAVENOUS at 12:56

## 2025-01-10 RX ADMIN — ACETAMINOPHEN 1000 MG: 500 TABLET ORAL at 12:56

## 2025-01-10 RX ADMIN — HYDROMORPHONE HYDROCHLORIDE 0.5 MG: 1 INJECTION, SOLUTION INTRAMUSCULAR; INTRAVENOUS; SUBCUTANEOUS at 17:29

## 2025-01-10 RX ADMIN — SODIUM CHLORIDE, POTASSIUM CHLORIDE, SODIUM LACTATE AND CALCIUM CHLORIDE 1000 ML: 600; 310; 30; 20 INJECTION, SOLUTION INTRAVENOUS at 17:28

## 2025-01-10 ASSESSMENT — COLUMBIA-SUICIDE SEVERITY RATING SCALE - C-SSRS
1. IN THE PAST MONTH, HAVE YOU WISHED YOU WERE DEAD OR WISHED YOU COULD GO TO SLEEP AND NOT WAKE UP?: NO
2. HAVE YOU ACTUALLY HAD ANY THOUGHTS OF KILLING YOURSELF IN THE PAST MONTH?: NO
6. HAVE YOU EVER DONE ANYTHING, STARTED TO DO ANYTHING, OR PREPARED TO DO ANYTHING TO END YOUR LIFE?: NO

## 2025-01-10 ASSESSMENT — ACTIVITIES OF DAILY LIVING (ADL)
ADLS_ACUITY_SCORE: 41

## 2025-01-10 NOTE — ED TRIAGE NOTES
Pt ambulatory to ED w/ complaints of N/V and generalized body aches. Symptoms started at approx. 0600 this morning. Pt states he has had multiple episodes of emesis that are yellow in color. PT denies fevers, hematemesis, SOB, abdominal pain,  or chest pain.Pt denies any home interventions. Last BM was 1/9/25. PT denies any chance of pregnancy and last menstraul cycle was on 12/9/25.     /80   Pulse 109   Temp 97.9  F (36.6  C)   Resp 16   SpO2 97%       Hxc: POTS, hypothyriodism     Triage Assessment (Adult)       Row Name 01/10/25 1122          Triage Assessment    Airway WDL WDL        Respiratory WDL    Respiratory WDL WDL        Skin Circulation/Temperature WDL    Skin Circulation/Temperature WDL WDL        Cardiac WDL    Cardiac WDL WDL        Peripheral/Neurovascular WDL    Peripheral Neurovascular WDL WDL        Cognitive/Neuro/Behavioral WDL    Cognitive/Neuro/Behavioral WDL WDL

## 2025-01-10 NOTE — ED NOTES
BIBA from home for complains of nausea and vomiting which started at 6 am. Denied fevers, diarrhea.

## 2025-01-10 NOTE — ED PROVIDER NOTES
"    [place] EMERGENCY DEPARTMENT ([facility])    1/10/25       ED PROVIDER NOTE       History     Chief Complaint   Patient presents with    Nausea & Vomiting     HPI  Ellis Hull is a 24 year old adult with a history of prediabetes, asthma, who presents to the ED with nausea and vomiting.     Patient reports nausea, vomiting, sweats, and generalized body aches since this morning. Patient states he thinks he has Norovirus. He reports he has not been able to leave his bathroom floor and is worried he is going to pass out. He has not measured his temperature at home. Denies abdominal pain, fevers, or issues with bowel/bladder. He has not taken any medication for his symptoms. Denies any new shortness of breath or chest pain. 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     Past Medical History  Past Medical History:   Diagnosis Date    Epistaxis, hereditary     Uncomplicated asthma      Past Surgical History:   Procedure Laterality Date    TRANSGENDER MASTECTOMY Bilateral 2/16/2021    Procedure: Bilateral mastectomy;  Surgeon: Christie Hines MD;  Location: [facility] [place]     albuterol (ACCUNEB) 1.25 MG/3ML neb solution  B-D HYPODERMIC NEEDLE 23G X 1\" MISC  B-D [facility]YR[facility]IVAN [facility]  BD DISP NEEDLES 20G X 1\" MISC  cholecalciferol (VITAMIN D3) 125 mcg (5000 units) capsule  levothyroxine (SYNTHROID/LEVOTHROID) 50 MCG tablet  minoxidil (LONITEN) 2.5 MG tablet  propranolol (INDERAL) 40 MG tablet  SUMAtriptan (IMITREX) 25 MG tablet  testosterone cypionate (DEPOTESTOSTERONE) 200 MG/ML injection  venlafaxine (EFFEXOR XR) 75 MG 24 hr capsule  VYVANSE 70 MG capsule      Allergies   Allergen Reactions    Adhesive Tape Itching and Rash     To surgical tape / Exofin    Amoxicillin-Pot Clavulanate Diarrhea    Azithromycin Rash    Cefdinir Rash    Cefprozil Nausea and GI Disturbance     Family History  Family History   Problem Relation Age of Onset    Diabetes Type 2  Mother     Other Problems  " (pamela) Maternal Grandmother     Lung Cancer Maternal Grandmother     Heart Disease Maternal Grandfather     Diabetes Type 1 Maternal Grandfather      Social History   Social History     Tobacco Use    Smoking status: Never    Smokeless tobacco: Never   Substance Use Topics    Alcohol use: Yes     Comment: 1-2 drinks per year    Drug use: Yes     Types: Marijuana, LSD      Past medical history, past surgical history, medications, allergies, family history, and social history were reviewed with the patient. No additional pertinent items.     A complete review of systems was performed with pertinent positives and negatives noted in the HPI, and all other systems negative.    Physical Exam   BP: 127/80  Pulse: 109  Temp: 97.9  F (36.6  C)  Resp: 16  SpO2: 97 %  Physical Exam  Physical Exam   Constitutional: oriented to person, place, and time. appears well-developed and well-nourished.   HENT:   Head: Normocephalic and atraumatic.   Neck: Normal range of motion.   Pulmonary/Chest: Effort normal. No respiratory distress.   Cardiac: No murmurs, rubs, gallops. RRR.  Abdominal: Abdomen soft, nontender, nondistended. No rebound tenderness.  MSK: Long bones without deformity or evidence of trauma  Neurological: alert and oriented to person, place, and time.   Skin: Skin is warm and dry.   Psychiatric:  normal mood and affect.  behavior is normal. Thought content normal.      ED Course, Procedures, & Data      Procedures         Results for orders placed or performed during the hospital encounter of 01/10/25   Comprehensive metabolic panel     Status: Abnormal   Result Value Ref Range    Sodium 140 135 - 145 mmol/L    Potassium 4.3 3.4 - 5.3 mmol/L    Carbon Dioxide (CO2) 27 22 - 29 mmol/L    Anion Gap 12 7 - 15 mmol/L    Urea Nitrogen 11.7 6.0 - 20.0 mg/dL    Creatinine 0.69 0.51 - 1.17 mg/dL    GFR Estimate >90 >60 mL/min/1.73m2    Calcium 9.8 8.8 - 10.4 mg/dL    Chloride 101 98 - 107 mmol/L    Glucose 148 (H) 70 - 99 mg/dL  "   Alkaline Phosphatase 66 40 - 150 U/L    AST 31 0 - 45 U/L    ALT 55 0 - 70 U/L    Protein Total 7.9 6.4 - 8.3 g/dL    Albumin 4.9 3.5 - 5.2 g/dL    Bilirubin Total 0.5 <=1.2 mg/dL    Narrative    The generation of reference intervals for this test is currently based on binary male or female sex. If the electronic health record information indicates another gender identity or if Legal Sex is recorded as \"Unknown\", both male and female reference intervals are provided where applicable, and should be considered according to the individual's appropriate clinical context.   Lipase     Status: Normal   Result Value Ref Range    Lipase 19 13 - 60 U/L   Influenza A/B, RSV and SARS-CoV2 PCR (COVID-19) Nasopharyngeal     Status: Normal    Specimen: Nasopharyngeal; Swab   Result Value Ref Range    Influenza A PCR Negative Negative    Influenza B PCR Negative Negative    RSV PCR Negative Negative    SARS CoV2 PCR Negative Negative    Narrative    Testing was performed using the Xpert Xpress CoV2/Flu/RSV Assay on the [employer] GeneXpert Instrument. This test should be ordered for the detection of SARS-CoV2, influenza, and RSV viruses in individuals with signs and symptoms of respiratory tract infection. This test is for in vitro diagnostic use under the [place] FDA for laboratories certified under CLIA to perform high or moderate complexity testing. This test has been [place] FDA cleared. A negative result does not rule out the presence of PCR inhibitors in the specimen or target RNA in concentration below the limit of detection for the assay. If only one viral target is positive but coinfection with multiple targets is suspected, the sample should be re-tested with another FDA cleared, approved, or authorized test, if coninfection would change clinical management. This test was validated by the [facility]. These laboratories are certified under the Clinical Laboratory Improvement Amendments of 1988 (CLIA-88) as " "qualified to perfom high complexity laboratory testing.   CBC with platelets and differential     Status: Abnormal   Result Value Ref Range    WBC Count 14.5 (H) 4.0 - 11.0 10e3/uL    RBC Count 5.89 3.80 - 5.90 10e6/uL    Hemoglobin 16.0 11.7 - 17.7 g/dL    Hematocrit 51.0 35.0 - 53.0 %    MCV 87 78 - 100 fL    MCH 27.2 26.5 - 33.0 pg    MCHC 31.4 (L) 31.5 - 36.5 g/dL    RDW 13.2 10.0 - 15.0 %    Platelet Count 312 150 - 450 10e3/uL    % Neutrophils 91 %    % Lymphocytes 4 %    % Monocytes 4 %    % Eosinophils 1 %    % Basophils 0 %    % Immature Granulocytes 1 %    NRBCs per 100 WBC 0 <1 /100    Absolute Neutrophils 13.1 (H) 1.6 - 8.3 10e3/uL    Absolute Lymphocytes 0.6 (L) 0.8 - 5.3 10e3/uL    Absolute Monocytes 0.6 0.0 - 1.3 10e3/uL    Absolute Eosinophils 0.1 0.0 - 0.7 10e3/uL    Absolute Basophils 0.0 0.0 - 0.2 10e3/uL    Absolute Immature Granulocytes 0.1 <=0.4 10e3/uL    Absolute NRBCs 0.0 10e3/uL    Narrative    The generation of reference intervals for this test is currently based on binary male or female sex. If the electronic health record information indicates another gender identity or if Legal Sex is recorded as \"Unknown\", both male and female reference intervals are provided where applicable, and should be considered according to the individual's appropriate clinical context.   HCG quantitative pregnancy (blood)     Status: Normal   Result Value Ref Range    hCG Quantitative <1 <5 mIU/mL   CBC with platelets differential     Status: Abnormal    Narrative    The following orders were created for panel order CBC with platelets differential.  Procedure                               Abnormality         Status                     ---------                               -----------         ------                     CBC with platelets and d...[[id number]]  Abnormal            Final result                 Please view results for these tests on the individual orders.     Medications - No data to display  Labs " Ordered and Resulted from Time of ED Arrival to Time of ED Departure - No data to display  No orders to display          Critical care was not performed.     Medical Decision Making  The patient's presentation was of moderate complexity (an undiagnosed new problem with uncertain prognosis).    The patient's evaluation involved:  ordering and/or review of 3+ test(s) in this encounter (see separate area of note for details)    The patient's management necessitated moderate risk (IV contrast administration).    Assessment & Plan    Patient resenting with abdominal pain, vomiting.  Patient does have an elevated white blood count, CT is pending.  If CT is unremarkable likely disposition home with Zofran.    I have reviewed the nursing notes. I have reviewed the findings, diagnosis, plan and need for follow up with the patient.    New Prescriptions    No medications on file       Final diagnoses:   Nausea and vomiting, unspecified vomiting type   I, Estrella Jackson, am serving as a trained medical scribe to document services personally performed by Nura Cameron MD based on the provider's statements to me on January 10, 2025.  This document has been checked and approved by the attending provider.    I, Nura Cameron MD, was physically present and have reviewed and verified the accuracy of this note documented by Estrella Jackson, medical scribe.      Nura Cameron MD   [facility] EMERGENCY DEPARTMENT  1/10/2025     Nura Cameron MD  01/10/25 3081

## 2025-01-10 NOTE — H&P
[facility]    History and Physical - Hospitalist Service, GOLD TEAM        Date of Admission:  1/10/2025    Assessment & Plan    Ellis Hull (he/him or they/them) is a 24 year old adult admitted on 1/10/2025. He has a past medical history of prediabetes, asthma, hypothyroidism, chronic fatigue, migrainea, MDD, ADHD, gender affirming care. They presented to the ED with intractable nausea and vomiting. Admitted to internal medicine for IV fluids and observation.   _________________________    # Nausea, vomiting   # Concern for viral gastroenteritis   # Mesenteric adenopathy   # Leukocytosis   Patient presents with 1 day of nausea and vomiting without any diarrhea or hematemesis. Initial labs demonstrate leukocytosis of 14.5 with a negative COVID/influenza/RSV. Despite the persistent vomiting, BMP upon arrival was reassuringly WNL. Given the patient's history, symptoms, and the current outbreak of norovirus in the community, norovirus or other viral gastroenteritis is high on the differential. On arrival, CT c/a/p demonstrated mesenteric adenitis consistent with gastroenteritis. They are HDS on room air. Does have abdominal pain but notes that he has chronic pain so this is not much different.   - IV LR infusion, encourage PO intake   - PRN antiemetics, tylenol   - AM CBC, CMP   - Once nausea is improved, okay to advance diet as tolerated   - No diarrhea at this time; if diarrhea, can consider enteric panel.     # Gender affirming care   # Androgenic alopecia  - Continue pta testosterone  - Continue pta minoxidil     # Postural dizziness with presyncope  Follows with PCP Dr. Aguilar last seen 12/4/24.  Suspected dx of POTS though difficult to assess given high dose of vyvanse and SNRI. NASA lean test positive for tachycardia and sx of orthostatic intolerance w/o hypotension. Recent zio patch was reassuring. Echo normal 12/26/25.  - Noted     # Hypothyroidism   #  Chronic pain  # Chronic fatigue  # Impaired mobility and ADLs   Patient follows closely with PCP with chronic fatigue, weakness symptoms, all nonspecific. Suspected to be related to ME/CFS.     - Continue pta levothyroxine 50 mcg daily.   - PT ordered     # MDD   # ADHD   - Continue pta venlafaxine, vyvanse     # Chronic migraine with aura   - PRN  sumatriptan   - Continue pta propanolol     # Prediabetes   A1C 5.6 on 4/03/24.   - A1C recheck     # Asthma  - PRN albuterol     # Hepatic steatosis   Noted on imaging 1/10/25.   - Noted           Diet:  Reg adult diet   DVT Prophylaxis: Pneumatic Compression Devices  Ferrer Catheter: Not present  Lines: None     Cardiac Monitoring: None  Code Status:  Full code     Clinically Significant Risk Factors Present on Admission                   # Hypertension: Home medication list includes antihypertensive(s)               # Asthma: noted on problem list        Disposition Plan     Medically Ready for Discharge: Anticipated Tomorrow         The patient's care was discussed with the Attending Physician, Dr. Scott and Patient.    Sam Ahmadi PA-C  Hospitalist Service, [facility]  Securely message with [employer] (more info)  Text page via [facility] Paging/Directory   See signed in provider for up to date coverage information    ______________________________________________________________________    Chief Complaint   Nausea vomiting     History is obtained from the patient    History of Present Illness   Ellis Hull (he/they) is a 24 year old adult with a history of prediabetes, asthma, who presented to the ED with nausea and vomiting.      Patient reports nausea, vomiting, sweats, and generalized body aches since this morning. He reported he had not been able to leave his bathroom floor and is worried he is going to pass out. He has not measured his temperature at home. Denies abdominal pain worse than baseline, fevers, or  "issues with bowel/bladder. He has not taken any medication for his symptoms. Denies any new shortness of breath or chest pain. 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 Denies hematemesis, hematochezia, melena.       Past Medical History    Past Medical History:   Diagnosis Date    Epistaxis, hereditary     Uncomplicated asthma        Past Surgical History   Past Surgical History:   Procedure Laterality Date    TRANSGENDER MASTECTOMY Bilateral 2/16/2021    Procedure: Bilateral mastectomy;  Surgeon: Christie Hines MD;  [place] [facility] [place]       Prior to Admission Medications   Prior to Admission Medications   Prescriptions Last Dose Informant Patient Reported? Taking?   B-D HYPODERMIC NEEDLE 23G X 1\" MISC   Yes No   Sig: USE TO INJECT TESTOSTERONE CYPIONATE INTO THE MUSCLE ONCE WEEKLY   B-D SYRINGE LUER-IVAN 1 ML MISC   Yes No   Sig: USE WITH WEEKLY INJECTIONS OF TESTOSTERONE CYPIONATE   BD DISP NEEDLES 20G X 1\" MISC   Yes No   Sig: USE TO DRAW UP WEEKLY DOSE OF TESTOSTERONE CYPIONATE   SUMAtriptan (IMITREX) 25 MG tablet   No No   Sig: Take 1 tablet (25 mg) by mouth at onset of headache for migraine. May repeat in 2 hours. Max 8 tablets/24 hours.   VYVANSE 70 MG capsule   Yes No   Sig: Take 70 mg by mouth every morning.   albuterol (ACCUNEB) 1.25 MG/3ML neb solution   Yes No   Sig: Inhale 1.25 mg into the lungs   cholecalciferol (VITAMIN D3) 125 mcg (5000 units) capsule   Yes No   Sig: Take by mouth daily.   levothyroxine (SYNTHROID/LEVOTHROID) 50 MCG tablet   Yes No   Sig: Take 50 mcg by mouth   minoxidil (LONITEN) 2.5 MG tablet   No No   Sig: Take 1 tablet (2.5 mg) by mouth daily.   propranolol (INDERAL) 40 MG tablet   No No   Sig: Take 1 tablet (40 mg) by mouth 2 times daily.   testosterone cypionate (DEPOTESTOSTERONE) 200 MG/ML injection   No No   Sig: Inject 0.2 mLs (40 mg) into the muscle once a week.   venlafaxine (EFFEXOR XR) 75 MG 24 hr capsule   Yes No   Sig: Take 75 mg by mouth " daily.      Facility-Administered Medications: None           Physical Exam   Vital Signs: Temp: 97.9  F (36.6  C)   BP: 127/80 Pulse: 109   Resp: 16 SpO2: 97 %      Weight: 0 lbs 0 oz    Constitutional: awake, alert, cooperative, appears uncomfortable   Respiratory: No increased work of breathing, good air exchange, clear to auscultation bilaterally, no crackles or wheezing  Cardiovascular: Normal apical impulse, regular rate and rhythm, and no murmur noted  GI: normal bowel sounds, soft, non-distended, mild tenderness to palpation diffusely without rebound tenderness   Skin: no bruising or bleeding of visible skin, no jaundice   Musculoskeletal: There is no redness, warmth, or swelling of the visible joints, no UNIQUE, no calve swelling   Neurologic: Awake, alert, oriented to name, place and time.            Medical Decision Making       60 MINUTES SPENT BY ME on the date of service doing chart review, history, exam, documentation & further activities per the note.      Data   Recent Labs   Lab 01/10/25  1152   WBC 14.5*   HGB 16.0   MCV 87         POTASSIUM 4.3   CHLORIDE 101   CO2 27   BUN 11.7   CR 0.69   ANIONGAP 12   ALANA 9.8   *   ALBUMIN 4.9   PROTTOTAL 7.9   BILITOTAL 0.5   ALKPHOS 66   ALT 55   AST 31   LIPASE 19

## 2025-01-10 NOTE — DISCHARGE INSTRUCTIONS
Home.  More likely viral infection.  No sign of obvious appendicitis on CT.  Zofran for nausea.  Follow up with MD  Return if ongoing concerns.    Results for orders placed or performed during the hospital encounter of 01/10/25   CT Abdomen Pelvis w Contrast     Status: None    Narrative    EXAMINATION: CT ABDOMEN PELVIS W CONTRAST, 1/10/2025 2:51 PM    INDICATION: rlq pain, vomiting, elevated WBC    COMPARISON STUDY: None    TECHNIQUE: CT scan of the abdomen and pelvis was performed on  multidetector CT scanner using volumetric acquisition technique and  images were reconstructed in multiple planes with variable thickness  and reviewed on dedicated workstations.     CONTRAST: 135 mL Isovue-370 injected IV without oral contrast    CT scan radiation dose is optimized to minimum requisite dose using  automated dose modulation techniques.    FINDINGS:    Lower thorax: Normal.    Liver: No mass. No intrahepatic biliary ductal dilation. Hepatic  steatosis. Hyperattenuating areas along the myrtle hepatis and  gallbladder fossa most consistent with focal fatty sparing.    Biliary System: Normal gallbladder. No extrahepatic biliary ductal  dilation.    Pancreas: No mass or pancreatic ductal dilation.    Adrenal glands: No mass or nodules    Spleen: Normal.    Kidneys: No suspicious mass, obstructing calculus or hydronephrosis.    Gastrointestinal tract : Normal caliber small bowel.  The appendix is  not definitively identified. No significant inflammatory changes in  the right lower quadrant.    Mesentery/peritoneum/retroperitoneum: No mass. No free fluid or air.    Lymph nodes: Numerous mesenteric lymph nodes are demonstrated  throughout the abdomen, the largest just inferior to the third part of  the duodenum measures 10 mm (4/144).    Vasculature: Patent major abdominal vasculature. The portal and  superior mesenteric vein are patent. Patent origins of the celiac axis  and superior mesenteric artery.    Pelvis: Urinary  "bladder is normal. Unremarkable uterus. Simple  attenuating bilateral adnexal cysts.    Osseous structures: No aggressive or acute osseous lesion.      Soft tissues: Within normal limits.      Impression    IMPRESSION:   1. The appendix is not definitively identified. No significant  inflammatory changes in the right lower quadrant.  2. Mesenteric adenopathy, nonspecific and may be reactive versus  related to mesenteric adenitis in a patient of this age.  3. Hepatic steatosis.    I have personally reviewed the examination and initial interpretation  and I agree with the findings.    TONY RON MD         SYSTEM ID:  [id number]   Comprehensive metabolic panel     Status: Abnormal   Result Value Ref Range    Sodium 140 135 - 145 mmol/L    Potassium 4.3 3.4 - 5.3 mmol/L    Carbon Dioxide (CO2) 27 22 - 29 mmol/L    Anion Gap 12 7 - 15 mmol/L    Urea Nitrogen 11.7 6.0 - 20.0 mg/dL    Creatinine 0.69 0.51 - 1.17 mg/dL    GFR Estimate >90 >60 mL/min/1.73m2    Calcium 9.8 8.8 - 10.4 mg/dL    Chloride 101 98 - 107 mmol/L    Glucose 148 (H) 70 - 99 mg/dL    Alkaline Phosphatase 66 40 - 150 U/L    AST 31 0 - 45 U/L    ALT 55 0 - 70 U/L    Protein Total 7.9 6.4 - 8.3 g/dL    Albumin 4.9 3.5 - 5.2 g/dL    Bilirubin Total 0.5 <=1.2 mg/dL    Narrative    The generation of reference intervals for this test is currently based on binary male or female sex. If the electronic health record information indicates another gender identity or if Legal Sex is recorded as \"Unknown\", both male and female reference intervals are provided where applicable, and should be considered according to the individual's appropriate clinical context.   Lipase     Status: Normal   Result Value Ref Range    Lipase 19 13 - 60 U/L   Influenza A/B, RSV and SARS-CoV2 PCR (COVID-19) Nasopharyngeal     Status: Normal    Specimen: Nasopharyngeal; Swab   Result Value Ref Range    Influenza A PCR Negative Negative    Influenza B PCR Negative Negative    RSV PCR " Negative Negative    SARS CoV2 PCR Negative Negative    Narrative    Testing was performed using the Xpert Xpress CoV2/Flu/RSV Assay on the [employer] GeneXpert Instrument. This test should be ordered for the detection of SARS-CoV2, influenza, and RSV viruses in individuals with signs and symptoms of respiratory tract infection. This test is for in vitro diagnostic use under the [place] FDA for laboratories certified under CLIA to perform high or moderate complexity testing. This test has been [place] FDA cleared. A negative result does not rule out the presence of PCR inhibitors in the specimen or target RNA in concentration below the limit of detection for the assay. If only one viral target is positive but coinfection with multiple targets is suspected, the sample should be re-tested with another FDA cleared, approved, or authorized test, if coninfection would change clinical management. This test was validated by the [facility]. These laboratories are certified under the Clinical Laboratory Improvement Amendments of 1988 (CLIA-88) as qualified to perfom high complexity laboratory testing.   CBC with platelets and differential     Status: Abnormal   Result Value Ref Range    WBC Count 14.5 (H) 4.0 - 11.0 10e3/uL    RBC Count 5.89 3.80 - 5.90 10e6/uL    Hemoglobin 16.0 11.7 - 17.7 g/dL    Hematocrit 51.0 35.0 - 53.0 %    MCV 87 78 - 100 fL    MCH 27.2 26.5 - 33.0 pg    MCHC 31.4 (L) 31.5 - 36.5 g/dL    RDW 13.2 10.0 - 15.0 %    Platelet Count 312 150 - 450 10e3/uL    % Neutrophils 91 %    % Lymphocytes 4 %    % Monocytes 4 %    % Eosinophils 1 %    % Basophils 0 %    % Immature Granulocytes 1 %    NRBCs per 100 WBC 0 <1 /100    Absolute Neutrophils 13.1 (H) 1.6 - 8.3 10e3/uL    Absolute Lymphocytes 0.6 (L) 0.8 - 5.3 10e3/uL    Absolute Monocytes 0.6 0.0 - 1.3 10e3/uL    Absolute Eosinophils 0.1 0.0 - 0.7 10e3/uL    Absolute Basophils 0.0 0.0 - 0.2 10e3/uL    Absolute Immature Granulocytes 0.1 <=0.4 10e3/uL  "   Absolute NRBCs 0.0 10e3/uL    Narrative    The generation of reference intervals for this test is currently based on binary male or female sex. If the electronic health record information indicates another gender identity or if Legal Sex is recorded as \"Unknown\", both male and female reference intervals are provided where applicable, and should be considered according to the individual's appropriate clinical context.   HCG quantitative pregnancy (blood)     Status: Normal   Result Value Ref Range    hCG Quantitative <1 <5 mIU/mL   UA with Microscopic reflex to Culture     Status: Abnormal    Specimen: Urine, Clean Catch   Result Value Ref Range    Color Urine Yellow Colorless, Straw, Light Yellow, Yellow    Appearance Urine Clear Clear    Glucose Urine Negative Negative mg/dL    Bilirubin Urine Negative Negative    Ketones Urine Negative Negative mg/dL    Specific Gravity Urine 1.010 1.003 - 1.035    Blood Urine Negative Negative    pH Urine 7.5 (H) 5.0 - 7.0    Protein Albumin Urine 20 (A) Negative mg/dL    Urobilinogen Urine Normal Normal, 2.0 mg/dL    Nitrite Urine Negative Negative    Leukocyte Esterase Urine Small (A) Negative    Mucus Urine Present (A) None Seen /LPF    RBC Urine 3 (H) <=2 /HPF    WBC Urine 6 (H) <=5 /HPF    Squamous Epithelials Urine 1 <=1 /HPF    Transitional Epithelials Urine <1 <=1 /HPF    Narrative    Urine Culture not indicated   CBC with platelets differential     Status: Abnormal    Narrative    The following orders were created for panel order CBC with platelets differential.  Procedure                               Abnormality         Status                     ---------                               -----------         ------                     CBC with platelets and d...[[id number]]  Abnormal            Final result                 Please view results for these tests on the individual orders.         "

## 2025-01-11 LAB
ALBUMIN SERPL BCG-MCNC: 3.8 G/DL (ref 3.5–5.2)
ALP SERPL-CCNC: 41 U/L (ref 40–150)
ALT SERPL W P-5'-P-CCNC: 35 U/L (ref 0–70)
ANION GAP SERPL CALCULATED.3IONS-SCNC: 10 MMOL/L (ref 7–15)
AST SERPL W P-5'-P-CCNC: 22 U/L (ref 0–45)
BILIRUB SERPL-MCNC: 0.5 MG/DL
BUN SERPL-MCNC: 10.7 MG/DL (ref 6–20)
CALCIUM SERPL-MCNC: 8.1 MG/DL (ref 8.8–10.4)
CHLORIDE SERPL-SCNC: 100 MMOL/L (ref 98–107)
CREAT SERPL-MCNC: 0.71 MG/DL (ref 0.51–1.17)
EGFRCR SERPLBLD CKD-EPI 2021: >90 ML/MIN/1.73M2
ERYTHROCYTE [DISTWIDTH] IN BLOOD BY AUTOMATED COUNT: 13.5 % (ref 10–15)
GLUCOSE SERPL-MCNC: 121 MG/DL (ref 70–99)
HCO3 SERPL-SCNC: 27 MMOL/L (ref 22–29)
HCT VFR BLD AUTO: 40 % (ref 35–53)
HGB BLD-MCNC: 12.6 G/DL (ref 11.7–17.7)
LACTATE SERPL-SCNC: 1.3 MMOL/L (ref 0.7–2)
MCH RBC QN AUTO: 27.2 PG (ref 26.5–33)
MCHC RBC AUTO-ENTMCNC: 31.5 G/DL (ref 31.5–36.5)
MCV RBC AUTO: 86 FL (ref 78–100)
PLATELET # BLD AUTO: 221 10E3/UL (ref 150–450)
POTASSIUM SERPL-SCNC: 3.6 MMOL/L (ref 3.4–5.3)
PROT SERPL-MCNC: 6.1 G/DL (ref 6.4–8.3)
RBC # BLD AUTO: 4.64 10E6/UL (ref 3.8–5.9)
SODIUM SERPL-SCNC: 137 MMOL/L (ref 135–145)
WBC # BLD AUTO: 7.5 10E3/UL (ref 4–11)

## 2025-01-11 PROCEDURE — 99207 PR APP CREDIT; MD BILLING SHARED VISIT: CPT | Mod: FS | Performed by: PHYSICIAN ASSISTANT

## 2025-01-11 PROCEDURE — 258N000003 HC RX IP 258 OP 636: Performed by: STUDENT IN AN ORGANIZED HEALTH CARE EDUCATION/TRAINING PROGRAM

## 2025-01-11 PROCEDURE — 84155 ASSAY OF PROTEIN SERUM: CPT

## 2025-01-11 PROCEDURE — 250N000013 HC RX MED GY IP 250 OP 250 PS 637

## 2025-01-11 PROCEDURE — 36415 COLL VENOUS BLD VENIPUNCTURE: CPT

## 2025-01-11 PROCEDURE — 84132 ASSAY OF SERUM POTASSIUM: CPT

## 2025-01-11 PROCEDURE — G0378 HOSPITAL OBSERVATION PER HR: HCPCS

## 2025-01-11 PROCEDURE — 36415 COLL VENOUS BLD VENIPUNCTURE: CPT | Performed by: STUDENT IN AN ORGANIZED HEALTH CARE EDUCATION/TRAINING PROGRAM

## 2025-01-11 PROCEDURE — 250N000011 HC RX IP 250 OP 636

## 2025-01-11 PROCEDURE — 96375 TX/PRO/DX INJ NEW DRUG ADDON: CPT

## 2025-01-11 PROCEDURE — 83605 ASSAY OF LACTIC ACID: CPT | Performed by: STUDENT IN AN ORGANIZED HEALTH CARE EDUCATION/TRAINING PROGRAM

## 2025-01-11 PROCEDURE — 99232 SBSQ HOSP IP/OBS MODERATE 35: CPT | Mod: FS | Performed by: STUDENT IN AN ORGANIZED HEALTH CARE EDUCATION/TRAINING PROGRAM

## 2025-01-11 PROCEDURE — 258N000003 HC RX IP 258 OP 636: Performed by: FAMILY MEDICINE

## 2025-01-11 PROCEDURE — 82040 ASSAY OF SERUM ALBUMIN: CPT

## 2025-01-11 PROCEDURE — 85027 COMPLETE CBC AUTOMATED: CPT

## 2025-01-11 RX ADMIN — LISDEXAMFETAMINE DIMESYLATE 70 MG: 70 CAPSULE ORAL at 10:40

## 2025-01-11 RX ADMIN — LEVOTHYROXINE SODIUM 50 MCG: 0.05 TABLET ORAL at 09:32

## 2025-01-11 RX ADMIN — SODIUM CHLORIDE, POTASSIUM CHLORIDE, SODIUM LACTATE AND CALCIUM CHLORIDE 1000 ML: 600; 310; 30; 20 INJECTION, SOLUTION INTRAVENOUS at 19:54

## 2025-01-11 RX ADMIN — PROPRANOLOL HYDROCHLORIDE 40 MG: 40 TABLET ORAL at 22:08

## 2025-01-11 RX ADMIN — SODIUM CHLORIDE, POTASSIUM CHLORIDE, SODIUM LACTATE AND CALCIUM CHLORIDE 1000 ML: 600; 310; 30; 20 INJECTION, SOLUTION INTRAVENOUS at 04:11

## 2025-01-11 RX ADMIN — SUMATRIPTAN SUCCINATE 25 MG: 25 TABLET ORAL at 11:56

## 2025-01-11 RX ADMIN — MINOXIDIL 2.5 MG: 2.5 TABLET ORAL at 09:32

## 2025-01-11 RX ADMIN — ACETAMINOPHEN 650 MG: 325 TABLET, FILM COATED ORAL at 11:56

## 2025-01-11 RX ADMIN — ACETAMINOPHEN 650 MG: 325 TABLET, FILM COATED ORAL at 19:47

## 2025-01-11 RX ADMIN — SODIUM CHLORIDE, POTASSIUM CHLORIDE, SODIUM LACTATE AND CALCIUM CHLORIDE 1000 ML: 600; 310; 30; 20 INJECTION, SOLUTION INTRAVENOUS at 11:58

## 2025-01-11 RX ADMIN — PROCHLORPERAZINE EDISYLATE 10 MG: 5 INJECTION INTRAMUSCULAR; INTRAVENOUS at 00:18

## 2025-01-11 RX ADMIN — Medication 125 MCG: at 09:31

## 2025-01-11 RX ADMIN — VENLAFAXINE HYDROCHLORIDE 75 MG: 75 CAPSULE, EXTENDED RELEASE ORAL at 09:32

## 2025-01-11 RX ADMIN — ACETAMINOPHEN 650 MG: 325 TABLET, FILM COATED ORAL at 00:18

## 2025-01-11 ASSESSMENT — ACTIVITIES OF DAILY LIVING (ADL)
ADLS_ACUITY_SCORE: 58
ADLS_ACUITY_SCORE: 56
ADLS_ACUITY_SCORE: 58

## 2025-01-11 NOTE — PLAN OF CARE
Obs Goals-   Vitals are stable  Taking minimal po intake.  No treatment for infection at this time.

## 2025-01-11 NOTE — PROGRESS NOTES
[facility]    Medicine Progress Note - Hospitalist Service, GOLD TEAM     Date of Admission:  1/10/2025    Assessment & Plan    Ellis Hull (he/him or they/them) is a 24 year old adult admitted on 1/10/2025. He has a past medical history of prediabetes, asthma, hypothyroidism, chronic fatigue, migrainea, MDD, ADHD, gender affirming care. They presented to the ED with intractable nausea and vomiting. Admitted to internal medicine for IV fluids and observation.   _________________________    # Nausea, vomiting   # Concern for viral gastroenteritis   # Mesenteric adenopathy   # Leukocytosis, resolved   Patient presents with 1 day of nausea and vomiting without any diarrhea or hematemesis. Initial labs demonstrate leukocytosis of 14.5 with a negative COVID/influenza/RSV. Despite the persistent vomiting, BMP upon arrival was reassuringly WNL. Given the patient's history, symptoms, and the current outbreak of norovirus in the community, norovirus or other viral gastroenteritis is high on the differential. On arrival, CT c/a/p demonstrated mesenteric adenitis consistent with gastroenteritis. They are HDS on room air. Does have abdominal pain but notes that he has chronic pain so this is not much different. Repeat labs this am again unremarkable including normal WBC. Fair to poor appetite today with ongoing nausea and low grade fever.   - IV LR infusion, encourage PO intake   - PRN antiemetics, tylenol   - Repeat lytes tomorrow am.   - Once nausea is improved, okay to advance diet as tolerated   - No diarrhea at this time; if diarrhea, can consider enteric panel.     # Gender affirming care   # Androgenic alopecia  - Continue pta testosterone  - Continue pta minoxidil     # Postural dizziness with presyncope  Follows with PCP Dr. Aguilar last seen 12/4/24.  Suspected dx of POTS though difficult to assess given high dose of vyvanse and SNRI. [employer] lean test positive for  tachycardia and sx of orthostatic intolerance w/o hypotension. Recent zio patch was reassuring. Echo normal 12/26/25.  - Noted     # Hypothyroidism   # Chronic pain  # Chronic fatigue  # Impaired mobility and ADLs   Patient follows closely with PCP with chronic fatigue, weakness symptoms, all nonspecific. Suspected to be related to ME/CFS.     - Continue pta levothyroxine 50 mcg daily.   - PT ordered     # MDD   # ADHD   - Continue pta venlafaxine, vyvanse     # Chronic migraine with aura   - PRN sumatriptan   - Continue pta propanolol     # Prediabetes   A1C 5.6 on 4/03/24. Recheck here 6.3%  - Follow up with PCP after discharge for ongoing evaluation and management.     # Asthma  - PRN albuterol     # Hepatic steatosis   Noted on imaging 1/10/25.   - Noted        Observation Goals: -vital signs normal or at patient baseline, -tolerating oral intake to maintain hydration, -infection is improving, Nurse to notify provider when observation goals have been met and patient is ready for discharge.  Diet: Regular Diet Adult    DVT Prophylaxis: Pneumatic Compression Devices  Ferrer Catheter: Not present  Lines: None     Cardiac Monitoring: None  Code Status: Full Code      Social Drivers of Health    Depression: Not at risk (12/4/2024)    PHQ-2     PHQ-2 Score: 2   Recent Concern: Depression - Risk of Self Harm (11/12/2024)    Received from [facility]    Depression (PHQ-9)     Thoughts of self harm: More than half the days   Housing Stability: High Risk (10/30/2024)    Housing Stability     Do you have housing? : Yes     Are you worried about losing your housing?: Yes   Physical Activity: Insufficiently Active (11/6/2023)    Received from [facility]    Exercise Vital Sign     Days of Exercise per Week: 2 days     Minutes of Exercise per Session: 20 min   Stress: Stress Concern Present (11/6/2023)    Received from [facility]    [employer] of  Occupational Health - Occupational Stress Questionnaire     Feeling of Stress : Very much   Social Connections: Socially Isolated (11/6/2023)    Received from [facility]    Social Connection and Isolation Panel [NHANES]     Frequency of Communication with Friends and Family: Once a week     Frequency of Social Gatherings with Friends and Family: Never     Attends Zoroastrianism Services: Never     Active Member of Clubs or Organizations: Yes     Attends Club or Organization Meetings: More than 4 times per year     Marital Status: Never           Disposition Plan     Medically Ready for Discharge: Anticipated Tomorrow    The patient's care was discussed with the Attending Physician, Dr. Polk, Bedside Nurse, and Patient.    Nura Beasley PA-C  Hospitalist Service, [facility]  Securely message with [employer] (more info)  Text page via [facility] Paging/Directory   See signed in provider for up to date coverage information  ______________________________________________________________________    Interval History   No acute events overnight. C/o ongoing nausea but denies vomiting and abd pain. Denies diarrhea. Low grade fever. Denies chills, chest pain, SOB and bladder concerns.     Physical Exam   Vital Signs: Temp: (!) 100.5  F (38.1  C) Temp src: Oral BP: 106/52 Pulse: 85   Resp: 18 SpO2: 94 % O2 Device: None (Room air)    Weight: 0 lbs 0 oz  GEN: In NAD  HEENT: NCAT; PERRL; sclerae non-icteric  LUNGS: CTAB  CV: RRR  ABD: +BSs; SNTND  EXT: No BLE edema  SKIN: No acute rashes noted on exposed areas.  NEURO: AAOx3; CNs grossly intact; No acute focal deficits noted.      Medical Decision Making       60 MINUTES SPENT BY ME on the date of service doing chart review, history, exam, documentation & further activities per the note.      Data   CMP  Recent Labs   Lab 01/11/25  0746 01/10/25  1152    140   POTASSIUM 3.6 4.3   CHLORIDE  100 101   CO2 27 27   ANIONGAP 10 12   * 148*   BUN 10.7 11.7   CR 0.71 0.69   GFRESTIMATED >90 >90   ALANA 8.1* 9.8   MAG  --  2.0   PROTTOTAL 6.1* 7.9   ALBUMIN 3.8 4.9   BILITOTAL 0.5 0.5   ALKPHOS 41 66   AST 22 31   ALT 35 55     CBC  Recent Labs   Lab 01/11/25  0943 01/10/25  1152   WBC 7.5 14.5*   RBC 4.64 5.89   HGB 12.6 16.0   HCT 40.0 51.0   MCV 86 87   MCH 27.2 27.2   MCHC 31.5 31.4*   RDW 13.5 13.2    312

## 2025-01-11 NOTE — H&P
-vital signs normal or at patient baseline  - progressing  -tolerating oral intake to maintain hydration -progressing  -infection is improving - progressing  Nurse to notify provider when observation goals have been met and patient is ready for discharge.

## 2025-01-11 NOTE — PROGRESS NOTES
Neuro: A&Ox4. Febrile, Tmax 100.5, PRN tylenol administered. Endorses dizziness when sitting up, resolves. Endorsing migraine, PRN Imitrex administered.   Cardiac: Sinus tach otherwise VSS. Denies chest pain.   Respiratory: Sating 95% on RA. Denies SOB  GI/: Adequate urine output, tea colored. No BM this shift. Endorses abdominal discomfort and intermittent nausea.   Diet/appetite: regular diet, minimal intake. Needs encouragement. Able to tolerate toast, crackers, juice and water  Activity:  Standby assist, endorses generalized weakness. Transfer belt with ambulation.   Pain: Endorses headache.   Skin: No new deficits noted.  LDA's: PIV infusing /hr. CDI.     Plan:     -Encourage PO intake, including fluids.  -Nausea management.  -Continue with POC. Notify primary team with changes.    OBS GOALS:   -vital signs normal or at patient baseline-PROGRESSING  -tolerating oral intake to maintain hydration-PROGRESSING  -infection is improving-PROGRESSING

## 2025-01-11 NOTE — ED PROVIDER NOTES
Patient seen by Dr. Cameron initially patient with nausea vomiting today.  Concern of possible norovirus influenza COVID RSV were negative.  Patient white count was 14 CT scan signed out to me revealed some mesenteric adenitis but did not appreciate acute appendicitis or other acute abnormalities symptomatic steatosis.  Initially signed out to me to discharge the patient but patient upon reassessment continues to have large quantities of vomiting.  Comfortable with pain also will be admitted as observation to medicine service I talked to them patient given Zofran along with continuous IV fluids here in the ER a dose of Dilaudid for pain control also.    This note was created at least in part by the use of dragon voice dictation system. Inadvertent typographical errors may still exist.  Efraín Trevino MD.  Patient evaluated in the emergency department during the COVID-19 pandemic period. Careful attention to patients safety was addressed throughout the evaluation. Evaluation and treatment management was initiated with disposition made efficiently and appropriate as possible to minimize any risk of potential exposure to patient during this evaluation.       Efraín Trevino MD  01/10/25 8195

## 2025-01-11 NOTE — MEDICATION SCRIBE - ADMISSION MEDICATION HISTORY
"Medication Scribe Admission Medication History    Admission medication history is complete. The information provided in this note is only as accurate as the sources available at the time of the update.    Information Source(s): Patient via in-person    Pertinent Information: per pt+DRH, pt reported taking medications on PTA medication list as directed.     Changes made to PTA medication list:  Added: None  Deleted: None  Changed: None    Allergies reviewed with patient and updates made in EHR: yes    Medication History Completed By: Lexi Skinner 1/11/2025 11:04 AM    PTA Med List   Medication Sig Note Last Dose/Taking    albuterol (ACCUNEB) 1.25 MG/3ML neb solution Inhale 1.25 mg into the lungs  More than a month    B-D HYPODERMIC NEEDLE 23G X 1\" MISC USE TO INJECT TESTOSTERONE CYPIONATE INTO THE MUSCLE ONCE WEEKLY  Taking    B-D SYRINGE LUER-IVAN 1 ML MISC USE WITH WEEKLY INJECTIONS OF TESTOSTERONE CYPIONATE  Taking    BD DISP NEEDLES 20G X 1\" MISC USE TO DRAW UP WEEKLY DOSE OF TESTOSTERONE CYPIONATE  Taking    cholecalciferol (VITAMIN D3) 125 mcg (5000 units) capsule Take by mouth daily.  1/9/2025    levothyroxine (SYNTHROID/LEVOTHROID) 50 MCG tablet Take 50 mcg by mouth  1/9/2025    minoxidil (LONITEN) 2.5 MG tablet Take 1 tablet (2.5 mg) by mouth daily.  1/9/2025    ondansetron (ZOFRAN ODT) 4 MG ODT tab Take 1 tablet (4 mg) by mouth every 8 hours as needed for nausea.  Taking As Needed    testosterone cypionate (DEPOTESTOSTERONE) 200 MG/ML injection Inject 0.2 mLs (40 mg) into the muscle once a week.  1/4/2025    venlafaxine (EFFEXOR XR) 75 MG 24 hr capsule Take 75 mg by mouth daily.  1/9/2025    VYVANSE 70 MG capsule Take 70 mg by mouth every morning. 1/11/2025: Not on DR  1/9/2025      "

## 2025-01-11 NOTE — PROVIDER NOTIFICATION
Provider notified for pt low BP 86/54 (64) and pt endorsing light headedness. Order for 1L LR bolus received and started. Pt current VS as below:     BP (!) 79/47 (BP Location: Right arm)   Pulse 109   Temp 99.8  F (37.7  C) (Oral)   Resp 16   LMP 12/09/2024 (Exact Date)   SpO2 94%

## 2025-01-12 ENCOUNTER — APPOINTMENT (OUTPATIENT)
Dept: PHYSICAL THERAPY | Facility: CLINIC | Age: 25
End: 2025-01-12
Payer: COMMERCIAL

## 2025-01-12 LAB
ALBUMIN SERPL BCG-MCNC: 3.5 G/DL (ref 3.5–5.2)
ALP SERPL-CCNC: 39 U/L (ref 40–150)
ALT SERPL W P-5'-P-CCNC: 49 U/L (ref 0–70)
ANION GAP SERPL CALCULATED.3IONS-SCNC: 11 MMOL/L (ref 7–15)
AST SERPL W P-5'-P-CCNC: 32 U/L (ref 0–45)
BILIRUB DIRECT SERPL-MCNC: <0.2 MG/DL (ref 0–0.3)
BILIRUB SERPL-MCNC: 0.2 MG/DL
BUN SERPL-MCNC: 4.8 MG/DL (ref 6–20)
CALCIUM SERPL-MCNC: 8.4 MG/DL (ref 8.8–10.4)
CHLORIDE SERPL-SCNC: 106 MMOL/L (ref 98–107)
CREAT SERPL-MCNC: 0.65 MG/DL (ref 0.51–1.17)
EGFRCR SERPLBLD CKD-EPI 2021: >90 ML/MIN/1.73M2
GLUCOSE SERPL-MCNC: 112 MG/DL (ref 70–99)
HCO3 SERPL-SCNC: 25 MMOL/L (ref 22–29)
LIPASE SERPL-CCNC: 18 U/L (ref 13–60)
MAGNESIUM SERPL-MCNC: 1.9 MG/DL (ref 1.7–2.3)
PHOSPHATE SERPL-MCNC: 1.9 MG/DL (ref 2.5–4.5)
POTASSIUM SERPL-SCNC: 3.7 MMOL/L (ref 3.4–5.3)
PROT SERPL-MCNC: 5.7 G/DL (ref 6.4–8.3)
SODIUM SERPL-SCNC: 142 MMOL/L (ref 135–145)

## 2025-01-12 PROCEDURE — 97530 THERAPEUTIC ACTIVITIES: CPT | Mod: GP

## 2025-01-12 PROCEDURE — 250N000013 HC RX MED GY IP 250 OP 250 PS 637: Performed by: STUDENT IN AN ORGANIZED HEALTH CARE EDUCATION/TRAINING PROGRAM

## 2025-01-12 PROCEDURE — G0378 HOSPITAL OBSERVATION PER HR: HCPCS

## 2025-01-12 PROCEDURE — 99207 PR APP CREDIT; MD BILLING SHARED VISIT: CPT | Mod: FS | Performed by: PHYSICIAN ASSISTANT

## 2025-01-12 PROCEDURE — 258N000003 HC RX IP 258 OP 636: Performed by: FAMILY MEDICINE

## 2025-01-12 PROCEDURE — 84100 ASSAY OF PHOSPHORUS: CPT | Performed by: PHYSICIAN ASSISTANT

## 2025-01-12 PROCEDURE — 83690 ASSAY OF LIPASE: CPT | Performed by: PHYSICIAN ASSISTANT

## 2025-01-12 PROCEDURE — 36415 COLL VENOUS BLD VENIPUNCTURE: CPT | Performed by: PHYSICIAN ASSISTANT

## 2025-01-12 PROCEDURE — 82310 ASSAY OF CALCIUM: CPT | Performed by: PHYSICIAN ASSISTANT

## 2025-01-12 PROCEDURE — 82248 BILIRUBIN DIRECT: CPT | Performed by: PHYSICIAN ASSISTANT

## 2025-01-12 PROCEDURE — 120N000002 HC R&B MED SURG/OB UMMC

## 2025-01-12 PROCEDURE — 99232 SBSQ HOSP IP/OBS MODERATE 35: CPT | Mod: FS | Performed by: STUDENT IN AN ORGANIZED HEALTH CARE EDUCATION/TRAINING PROGRAM

## 2025-01-12 PROCEDURE — 97161 PT EVAL LOW COMPLEX 20 MIN: CPT | Mod: GP

## 2025-01-12 PROCEDURE — 83735 ASSAY OF MAGNESIUM: CPT | Performed by: PHYSICIAN ASSISTANT

## 2025-01-12 PROCEDURE — 250N000013 HC RX MED GY IP 250 OP 250 PS 637

## 2025-01-12 PROCEDURE — 82040 ASSAY OF SERUM ALBUMIN: CPT | Performed by: PHYSICIAN ASSISTANT

## 2025-01-12 RX ADMIN — VENLAFAXINE HYDROCHLORIDE 75 MG: 75 CAPSULE, EXTENDED RELEASE ORAL at 09:44

## 2025-01-12 RX ADMIN — LEVOTHYROXINE SODIUM 50 MCG: 0.05 TABLET ORAL at 08:27

## 2025-01-12 RX ADMIN — Medication 125 MCG: at 08:27

## 2025-01-12 RX ADMIN — SODIUM CHLORIDE, POTASSIUM CHLORIDE, SODIUM LACTATE AND CALCIUM CHLORIDE 1000 ML: 600; 310; 30; 20 INJECTION, SOLUTION INTRAVENOUS at 11:15

## 2025-01-12 RX ADMIN — POTASSIUM & SODIUM PHOSPHATES POWDER PACK 280-160-250 MG 2 PACKET: 280-160-250 PACK at 22:18

## 2025-01-12 RX ADMIN — PROPRANOLOL HYDROCHLORIDE 40 MG: 40 TABLET ORAL at 08:27

## 2025-01-12 RX ADMIN — MINOXIDIL 2.5 MG: 2.5 TABLET ORAL at 09:45

## 2025-01-12 ASSESSMENT — ACTIVITIES OF DAILY LIVING (ADL)
ADLS_ACUITY_SCORE: 55
ADLS_ACUITY_SCORE: 58
ADLS_ACUITY_SCORE: 55
ADLS_ACUITY_SCORE: 58
ADLS_ACUITY_SCORE: 55
ADLS_ACUITY_SCORE: 55
ADLS_ACUITY_SCORE: 58
ADLS_ACUITY_SCORE: 55

## 2025-01-12 NOTE — PROGRESS NOTES
"OBS PT Eval     01/12/25 1500   Appointment Info   Signing Clinician's Name / Credentials (PT) Dung Rivero, PT, DPT   Rehab Comments (PT) consulted for education regarding home exercise plan   Self-Care   Activity/Exercise/Self-Care Comment IND at baseline but struggles with POTS. Is working on obtaining electric WC to improve safety with mobility and at work. Works as .   General Information   Onset of Illness/Injury or Date of Surgery 01/12/25   Referring Physician Sam Ahmadi PA-C   Patient/Family Therapy Goals Statement (PT) To regain strength   Pertinent History of Current Problem (include personal factors and/or comorbidities that impact the POC) Per EMR \"[place] E Domka (he/him or they/them) is a 24 year old adult admitted on 1/10/2025. He has a past medical history of prediabetes, asthma, hypothyroidism, chronic fatigue, migrainea, MDD, ADHD, gender affirming care. They presented to the ED with intractable nausea and vomiting. Admitted to internal medicine for IV fluids and observation. \"   Cognition   Affect/Mental Status (Cognition) WFL   Range of Motion (ROM)   ROM Comment not assessed   Strength (Manual Muscle Testing)   Strength Comments not assessed   Bed Mobility   Comment, (Bed Mobility) denies concerns   Transfers   Comment, (Transfers) reports IND for sit<>stand   Clinical Impression   Criteria for Skilled Therapeutic Intervention Yes, treatment indicated   PT Diagnosis (PT) impaired functional mobility   Influenced by the following impairments POTS, limited activity tolerance   Functional limitations due to impairments transfers, gait, stairs   Clinical Presentation (PT Evaluation Complexity) stable   Clinical Presentation Rationale clinical judgement   Clinical Decision Making (Complexity) low complexity   Planned Therapy Interventions (PT) postural re-education;ROM (range of motion);strengthening;stretching;transfer training   Risk & Benefits of therapy have been explained " evaluation/treatment results reviewed;care plan/treatment goals reviewed;risks/benefits reviewed;current/potential barriers reviewed;participants voiced agreement with care plan;participants included;patient   Clinical Impression Comments Patient without concerns about safe d/c, is at baseline. Requests PT consult to discuss HEP and exercise progression.   PT Total Evaluation Time   PT Eval, Low Complexity Minutes (81967) 8   Physical Therapy Goals   PT Frequency One time eval and treatment only   PT Predicted Duration/Target Date for Goal Attainment 01/12/25   PT Goals PT Goal 1  (verbalize IND understanding of HEP)   Interventions   Interventions Quick Adds Therapeutic Activity   PT Discharge Planning   PT Plan sign off after HEP ed   PT Discharge Recommendation (DC Rec) home with outpatient physical therapy   PT Rationale for DC Rec Patient with concers about baseline deconditioning. Educated on IND HEP with focus on functional body weight movements. Patient denies concerns about safe mobility for d/c home. Recommend OP follow-up for further strengthening.   PT Brief overview of current status IND   Physical Therapy Time and Intention   Total Session Time (sum of timed and untimed services) 8

## 2025-01-12 NOTE — PROGRESS NOTES
[facility]    Medicine Progress Note - Hospitalist Service    Date of Admission:  1/10/2025    Assessment & Plan    Ellis Hull (he/him or they/them) is a 24 year old adult admitted on 1/10/2025. He has a past medical history of prediabetes, asthma, hypothyroidism, chronic fatigue, migrainea, MDD, ADHD, gender affirming care. They presented to the ED with intractable nausea and vomiting. Admitted to internal medicine for IV fluids and observation.     Plan for today:  - C/o new upper abd pain, but repeat hepatic panel and lipase unremarkable.  - C/o new diarrhea, will obtain enteric panel  - Continue MIVFs    # Nausea, vomiting   # Concern for viral gastroenteritis   # Mesenteric adenopathy   # Leukocytosis, resolved   Patient presents with 1 day of nausea and vomiting without any diarrhea or hematemesis. Initial labs demonstrate leukocytosis of 14.5 with a negative COVID/influenza/RSV. Despite the persistent vomiting, BMP upon arrival was reassuringly WNL. Given the patient's history, symptoms, and the current outbreak of norovirus in the community, norovirus or other viral gastroenteritis is high on the differential. On arrival, CT c/a/p demonstrated mesenteric adenitis consistent with gastroenteritis. They are HDS on room air. Does have abdominal pain but notes that he has chronic pain so this is not much different. Repeat labs yesterday am again unremarkable including normal WBC. Appetite slowly improving. C/o ongoing nausea but no vomiting. Also c/o new upper abd pain, as well as liquid stool.   - C/o new upper abd pain, but repeat hepatic panel and lipase unremarkable.  - C/o new diarrhea, will obtain enteric panel  - Continue MIVFs  - PRN antiemetics, tylenol   - Repeat lytes tomorrow am.   - ADAT    # Gender affirming care   # Androgenic alopecia  - Continue pta testosterone  - Continue pta minoxidil     # Postural dizziness with presyncope  Follows with PCP  Dr. Aguilar last seen 12/4/24.  Suspected dx of POTS though difficult to assess given high dose of vyvanse and SNRI. [employer] lean test positive for tachycardia and sx of orthostatic intolerance w/o hypotension. Recent zio patch was reassuring. Echo normal 12/26/25.  - Noted     # Hypothyroidism   # Chronic pain  # Chronic fatigue  # Impaired mobility and ADLs   Patient follows closely with PCP with chronic fatigue, weakness symptoms, all nonspecific. Suspected to be related to ME/CFS.     - Continue pta levothyroxine 50 mcg daily.   - PT ordered     # MDD   # ADHD   - Continue pta venlafaxine, vyvanse     # Chronic migraine with aura   - PRN sumatriptan   - Continue pta propanolol     # Prediabetes   A1C 5.6 on 4/03/24. Recheck here 6.3%  - Follow up with PCP after discharge for ongoing evaluation and management.     # Asthma  - PRN albuterol     # Hepatic steatosis   Noted on imaging 1/10/25.   - Noted        Observation Goals: -vital signs normal or at patient baseline, -tolerating oral intake to maintain hydration, -infection is improving, Nurse to notify provider when observation goals have been met and patient is ready for discharge.  Diet: Regular Diet Adult    DVT Prophylaxis: Pneumatic Compression Devices  Ferrer Catheter: Not present  Lines: None     Cardiac Monitoring: None  Code Status: Full Code      Social Drivers of Health    Depression: Not at risk (12/4/2024)    PHQ-2     PHQ-2 Score: 2   Recent Concern: Depression - Risk of Self Harm (11/12/2024)    Received from [employer]    Depression (PHQ-9)     Thoughts of self harm: More than half the days   Housing Stability: High Risk (1/12/2025)    Housing Stability     Do you have housing? : Yes     Are you worried about losing your housing?: Yes   Physical Activity: Insufficiently Active (11/6/2023)    Received from [employer]    Exercise Vital Sign     Days of Exercise per Week: 2 days     Minutes of Exercise per  Session: 20 min   Stress: Stress Concern Present (11/6/2023)    Received from [facility]    [place] [employer] - Occupational Stress Questionnaire     Feeling of Stress : Very much   Social Connections: Socially Isolated (11/6/2023)    Received from [facility]    Social Connection and Isolation Panel [NHANES]     Frequency of Communication with Friends and Family: Once a week     Frequency of Social Gatherings with Friends and Family: Never     Attends [employer] Services: Never     Active Member of Clubs or Organizations: Yes     Attends Club or Organization Meetings: More than 4 times per year     Marital Status: Never           Disposition Plan     Medically Ready for Discharge: Anticipated Tomorrow    The patient's care was discussed with the Attending Physician, Dr. Polk, Bedside Nurse, and Patient.    Nura Beasley PA-C  Hospitalist Service  [facility]  Securely message with [employer] (more info)  Text page via [facility] Paging/Directory   ______________________________________________________________________    Interval History   No acute events overnight. C/o ongoing nausea but denies vomiting and abd pain. Denies diarrhea. Low grade fever. Denies chills, chest pain, SOB and bladder concerns.     Physical Exam   Vital Signs: Temp: 98.3  F (36.8  C) Temp src: Oral BP: 128/69 Pulse: 78   Resp: 18 SpO2: 100 % O2 Device: None (Room air)    Weight: 0 lbs 0 oz  GEN: In NAD  HEENT: NCAT; PERRL; sclerae non-icteric  LUNGS: CTAB  CV: RRR  ABD: +BSs; SNTND  EXT: No BLE edema  SKIN: No acute rashes noted on exposed areas.  NEURO: AAOx3; CNs grossly intact; No acute focal deficits noted.      Medical Decision Making       45 MINUTES SPENT BY ME on the date of service doing chart review, history, exam, documentation & further activities per the note.      Data   CMP  Recent Labs   Lab 01/12/25  5269  01/11/25  0746 01/10/25  1152   NA  --  137 140   POTASSIUM  --  3.6 4.3   CHLORIDE  --  100 101   CO2  --  27 27   ANIONGAP  --  10 12   GLC  --  121* 148*   BUN  --  10.7 11.7   CR  --  0.71 0.69   GFRESTIMATED  --  >90 >90   ALANA  --  8.1* 9.8   MAG  --   --  2.0   PROTTOTAL 5.7* 6.1* 7.9   ALBUMIN 3.5 3.8 4.9   BILITOTAL 0.2 0.5 0.5   ALKPHOS 39* 41 66   AST 32 22 31   ALT 49 35 55     CBC  Recent Labs   Lab 01/11/25  0943 01/10/25  1152   WBC 7.5 14.5*   RBC 4.64 5.89   HGB 12.6 16.0   HCT 40.0 51.0   MCV 86 87   MCH 27.2 27.2   MCHC 31.5 31.4*   RDW 13.5 13.2    312

## 2025-01-12 NOTE — PROVIDER NOTIFICATION
[facility] Observation:    Pt is complaining of 9/11 pain on the neck and abd. Pt has only Tylenol prn. Can you order something different?. Thank you.

## 2025-01-12 NOTE — PLAN OF CARE
Goal Outcome Evaluation:    [id number]  /77   Pulse 89   Temp 98.5  F (36.9  C) (Oral)   Resp 18   LMP 12/09/2024 (Exact Date)   SpO2 93%       Observation goals:       -vital signs normal or at patient baseline: Met  -tolerating oral intake to maintain hydration: In progress  -infection is improving: In progress    Pt. A&O, VSS, RA. C/o 9/10 neck and abd pains. Gave prn Tylenol and page for something else but didn't get any response. Up SBa to the bathroom. Voided so many times but no bm. PIV infusing LR @ 125 ml. No acute change, continue with poc.

## 2025-01-12 NOTE — PLAN OF CARE
Goal Outcome Evaluation:    Observation goals:       -vital signs normal or at patient baseline: Met  -tolerating oral intake to maintain hydration: In progress  -infection is improving: In progress

## 2025-01-12 NOTE — PLAN OF CARE
Physical Therapy Discharge Summary    Reason for therapy discharge:    All goals and outcomes met, no further needs identified.    Progress towards therapy goal(s). See goals on Care Plan in [facility] electronic health record for goal details.  Goals met    Therapy recommendation(s):    Continued therapy is recommended.  Rationale/Recommendations:  OP PT.

## 2025-01-12 NOTE — PLAN OF CARE
OBSERVATION GOAL OUTCOME EVALUATION:    -Vital signs normal or at patient baseline: MET.     -Tolerating oral intake to maintain hydration: MET.     -Infection is improving: TERELL - New onset diarrhea. Diagnostic labs to be collected.     /69 (BP Location: Right arm)   Pulse 78   Temp 98.3  F (36.8  C) (Oral)   Resp 18   LMP 12/09/2024 (Exact Date)   SpO2 100%

## 2025-01-12 NOTE — PROGRESS NOTES
Observation Goal Outcome:    -vital signs normal or at patient baseline: Met  -tolerating oral intake to maintain hydration: Met  -infection is improving: Progressing    PRIMARY DIAGNOSIS: GENERALIZED WEAKNESS    OUTPATIENT/OBSERVATION GOALS TO BE MET BEFORE DISCHARGE  1. Orthostatic performed: No    2. Tolerating PO medications: Yes    3. Return to near baseline physical activity: Yes    4. Cleared for discharge by consultants (if involved): No    Discharge Planner Nurse   Safe discharge environment identified: No  Barriers to discharge: Yes       Entered by: Rosalba Marc RN 01/12/2025 4:16 AM     Please review provider order for any additional goals.   Nurse to notify provider when observation goals have been met and patient is ready for discharge.

## 2025-01-12 NOTE — DISCHARGE SUMMARY
[facility]  Hospitalist Discharge Summary      Date of Admission:  1/10/2025  Date of Discharge:  1/13/2025  Discharging Provider: Denisse Gudino PA-C; Dr. Victorina Polk  Discharge Service: Hospitalist Service    Discharge Diagnoses   Nausea, vomiting   Abdominal pain   Concern for viral gastroenteritis  Mesenteric adenopathy   Leukocytosis, resolved   Gender affirming care  Androgenic alopecia  Postural dizziness with presyncope   Hypothyroidism   Chronic pain   Chronic fatigue   Impaired mobility and ADLs  MDD  ADHD  Chronic migraine with aura   Prediabetes  Asthma   Hepatic steatosis     Clinically Significant Risk Factors          Follow-ups Needed After Discharge   Follow-up Appointments       Adult [facility] Follow-up and recommended labs and tests      Follow up with primary care provider, Nohemi Aguilar, within 7 days for hospital follow- up.  No follow up labs or test are needed.      Appointments on [place] and/or [facility] (with [facility] or [facility] provider or service). Call [phone] if you haven't heard regarding these appointments within 7 days of discharge.                Unresulted Labs Ordered in the Past 30 Days of this Admission       No orders found from 12/11/2024 to 1/11/2025.            Discharge Disposition   Discharged to home  Condition at discharge: Stable    Hospital Course   Ellis Hull (he/him or they/them) is a 24 year old adult admitted on 1/10/2025. He has a past medical history of prediabetes, asthma, hypothyroidism, chronic fatigue, migrainea, MDD, ADHD, gender affirming care. They presented to the ED with intractable nausea and vomiting. Admitted to internal medicine for IV fluids and observation. Work up ultimately unrevealing. Presentation possibly in the setting of gastroenteritis, bowel movements intermittent and unable to obtain stool sample while admitted. Patient was treated with IVF and anti-emetics. Patient's  "symptoms gradually improved and diet was gradually advanced. At time of discharge, patient was able to tolerate PO without difficulty. Patient was discharged to home, discussed recommendations for follow up and precautions for return. Further details of hospital stay as outlined below.     # Nausea, vomiting   # Concern for viral gastroenteritis   # Mesenteric adenopathy   # Leukocytosis, resolved   Patient presents with 1 day of nausea and vomiting without any diarrhea or hematemesis. Initial labs demonstrate leukocytosis of 14.5 with a negative COVID/influenza/RSV. Despite the persistent vomiting, BMP upon arrival was reassuringly WNL. Given the patient's history, symptoms, and the current outbreak of norovirus in the community, norovirus or other viral gastroenteritis is high on the differential. On arrival, CT c/a/p demonstrated mesenteric adenitis consistent with gastroenteritis. They are HDS on room air. Does have abdominal pain but notes that he has chronic pain so this is not much different. Repeat labs yesterday am again unremarkable including normal WBC. Appetite slowly improving. C/o ongoing nausea but no vomiting. Also c/o new upper abd pain, as well as liquid stool. Repeat LFTs and lipase unremarkable. Also concern for loose stools, but intermittent and noted to be \"soft\" per patient's report at time of discharge. Unable to obtain enteric panel during hospital stay here. Overall improvement of symptoms on date of discharge and tolerating PO.   - PRN Zofran ordered on discharge   - Discussed precautions for return   - Follow up with PCP, scheduled for 01/14/2025    # Gender affirming care   # Androgenic alopecia  - Continue pta testosterone  - Continue pta minoxidil     # Postural dizziness with presyncope  Follows with PCP Dr. Aguilar last seen 12/4/24.  Suspected dx of POTS though difficult to assess given high dose of vyvanse and SNRI. [employer] lean test positive for tachycardia and sx of orthostatic " intolerance w/o hypotension. Recent zio patch was reassuring. Echo normal 12/26/25.  - Noted     # Hypothyroidism   # Chronic pain  # Chronic fatigue  # Impaired mobility and ADLs   Patient follows closely with PCP with chronic fatigue, weakness symptoms, all nonspecific. Suspected to be related to ME/CFS.     - Continue pta levothyroxine 50 mcg daily.   - PT referral placed for discharge     Hypophosphatemia, resolved    Suspect in the setting of poor PO intake. Replaced and repeat showed this had resolved     # MDD   # ADHD   - Continue pta venlafaxine, vyvanse     # Chronic migraine with aura   - PRN sumatriptan   - Continue pta propanolol     # Prediabetes   A1C 5.6 on 4/03/24. Recheck here 6.3%  - Follow up with PCP after discharge for ongoing evaluation and management.     # Asthma  - PRN albuterol     # Hepatic steatosis   Noted on imaging 1/10/25.   - Noted     Consultations This Hospital Stay   PHYSICAL THERAPY ADULT IP CONSULT  CARE MANAGEMENT / SOCIAL WORK IP CONSULT    Code Status   Full Code    Time Spent on this Encounter   I, Denisse Gudino PA-C, personally saw the patient today and spent greater than 30 minutes discharging this patient.       Denisse Gudino PA-C  [facility] [address] OBSERVATION  [address] [facility] [address]  Phone: [phone]  Fax: [phone]  ______________________________________________________________________    Physical Exam   Vital Signs: Temp: 98.2  F (36.8  C) Temp src: Oral BP: 117/66 Pulse: 76   Resp: 16 SpO2: 100 % O2 Device: None (Room air)    Weight: 265 lbs 10.47 oz  General: laying in bed, alert, cooperative, awake, in no acute distress  HEENT: normocephalic, atraumatic, anicteric sclera  Respiratory: breathing comfortably on room air, clear to auscultation bilaterally without wheezing, crackles, or rhonchi appreciated  Cardiac: regular rate and rhythm with normal S1/S2 without murmurs, clicks, rubs or gallops, 2+ radial pulse  on LUE, no signs of peripheral edema bilaterally  GI: soft, non-distended, normoactive bowel sounds, non-tender per palpation  Neuro: grossly non-focal, alert and oriented, normal speech  MSK: no bony deformities, moving all extremities independently  Skin: no rashes or lesions on uncovered surfaces, no jaundice         Primary Care Physician   Nohemi Aguilar    Discharge Orders      Primary Care - Care Coordination Referral      Physical Therapy  Referral      Reason for your hospital stay    You were in the hospital for abdominal pain, nausea     Activity    Your activity upon discharge: activity as tolerated     Adult [facility] Follow-up and recommended labs and tests    Follow up with primary care provider, Nohemi Aguilar, within 7 days for hospital follow- up.  No follow up labs or test are needed.      Appointments on [place] and/or [facility] (with [facility] or [facility] provider or service). Call [phone] if you haven't heard regarding these appointments within 7 days of discharge.     When to contact your care team    Please seek medical attention if you develop worsening of your symptoms, unable to tolerate oral intake, chest pain, difficulty breathing or shortness of breath.     Diet    Follow this diet upon discharge: Current Diet:Orders Placed This Encounter      Regular Diet Adult       Significant Results and Procedures   Results for orders placed or performed during the hospital encounter of 01/10/25   CT Abdomen Pelvis w Contrast    Narrative    EXAMINATION: CT ABDOMEN PELVIS W CONTRAST, 1/10/2025 2:51 PM    INDICATION: rlq pain, vomiting, elevated WBC    COMPARISON STUDY: None    TECHNIQUE: CT scan of the abdomen and pelvis was performed on  multidetector CT scanner using volumetric acquisition technique and  images were reconstructed in multiple planes with variable thickness  and reviewed on dedicated workstations.     CONTRAST: 135 mL Isovue-370 injected IV without oral  contrast    CT scan radiation dose is optimized to minimum requisite dose using  automated dose modulation techniques.    FINDINGS:    Lower thorax: Normal.    Liver: No mass. No intrahepatic biliary ductal dilation. Hepatic  steatosis. Hyperattenuating areas along the myrtle hepatis and  gallbladder fossa most consistent with focal fatty sparing.    Biliary System: Normal gallbladder. No extrahepatic biliary ductal  dilation.    Pancreas: No mass or pancreatic ductal dilation.    Adrenal glands: No mass or nodules    Spleen: Normal.    Kidneys: No suspicious mass, obstructing calculus or hydronephrosis.    Gastrointestinal tract : Normal caliber small bowel.  The appendix is  not definitively identified. No significant inflammatory changes in  the right lower quadrant.    Mesentery/peritoneum/retroperitoneum: No mass. No free fluid or air.    Lymph nodes: Numerous mesenteric lymph nodes are demonstrated  throughout the abdomen, the largest just inferior to the third part of  the duodenum measures 10 mm (4/144).    Vasculature: Patent major abdominal vasculature. The portal and  superior mesenteric vein are patent. Patent origins of the celiac axis  and superior mesenteric artery.    Pelvis: Urinary bladder is normal. Unremarkable uterus. Simple  attenuating bilateral adnexal cysts.    Osseous structures: No aggressive or acute osseous lesion.      Soft tissues: Within normal limits.      Impression    IMPRESSION:   1. The appendix is not definitively identified. No significant  inflammatory changes in the right lower quadrant.  2. Mesenteric adenopathy, nonspecific and may be reactive versus  related to mesenteric adenitis in a patient of this age.  3. Hepatic steatosis.    I have personally reviewed the examination and initial interpretation  and I agree with the findings.    TONY RON MD         SYSTEM ID:  [id number]       Discharge Medications   Current Discharge Medication List        START taking these  "medications    Details   !! ondansetron (ZOFRAN ODT) 4 MG ODT tab Take 1 tablet (4 mg) by mouth every 6 hours as needed for nausea or vomiting.  Qty: 15 tablet, Refills: 0    Associated Diagnoses: Nausea and vomiting, unspecified vomiting type      !! ondansetron (ZOFRAN ODT) 4 MG ODT tab Take 1 tablet (4 mg) by mouth every 8 hours as needed for nausea.  Qty: 10 tablet, Refills: 0       !! - Potential duplicate medications found. Please discuss with provider.        CONTINUE these medications which have NOT CHANGED    Details   albuterol (ACCUNEB) 1.25 MG/3ML neb solution Inhale 1.25 mg into the lungs      B-D HYPODERMIC NEEDLE 23G X 1\" MISC USE TO INJECT TESTOSTERONE CYPIONATE INTO THE MUSCLE ONCE WEEKLY      B-D SYRINGE LUER-IVAN 1 ML MISC USE WITH WEEKLY INJECTIONS OF TESTOSTERONE CYPIONATE      BD DISP NEEDLES 20G X 1\" MISC USE TO DRAW UP WEEKLY DOSE OF TESTOSTERONE CYPIONATE      cholecalciferol (VITAMIN D3) 125 mcg (5000 units) capsule Take by mouth daily.      levothyroxine (SYNTHROID/LEVOTHROID) 50 MCG tablet Take 50 mcg by mouth      minoxidil (LONITEN) 2.5 MG tablet Take 1 tablet (2.5 mg) by mouth daily.  Qty: 90 tablet, Refills: 3    Comments: Don't need to run through insurance - pt is using [employer] coupon  Associated Diagnoses: Hair loss      testosterone cypionate (DEPOTESTOSTERONE) 200 MG/ML injection Inject 0.2 mLs (40 mg) into the muscle once a week.  Qty: 4 mL, Refills: 4    Comments: Resending -- pt said this could not be filled but clinic did not receive any communication about prescription needs. Okay to void any other testosterone prescriptions on file  Associated Diagnoses: Gender dysphoria      venlafaxine (EFFEXOR XR) 75 MG 24 hr capsule Take 75 mg by mouth daily.      VYVANSE 70 MG capsule Take 70 mg by mouth every morning.      propranolol (INDERAL) 40 MG tablet Take 1 tablet (40 mg) by mouth 2 times daily.  Qty: 180 tablet, Refills: 1    Associated Diagnoses: Chronic migraine without aura " without status migrainosus, not intractable      SUMAtriptan (IMITREX) 25 MG tablet Take 1 tablet (25 mg) by mouth at onset of headache for migraine. May repeat in 2 hours. Max 8 tablets/24 hours.  Qty: 90 tablet, Refills: 0    Associated Diagnoses: Migraine with aura and without status migrainosus, not intractable           Allergies   Allergies   Allergen Reactions    Adhesive Tape Itching and Rash     To surgical tape / Exofin    Amoxicillin-Pot Clavulanate Diarrhea    Azithromycin Rash    Cefdinir Rash    Cefprozil Nausea and GI Disturbance

## 2025-01-12 NOTE — PLAN OF CARE
OBSERVATION GOAL OUTCOME EVALUATION:     -Vital signs normal or at patient baseline: MET.     -Tolerating oral intake to maintain hydration: MET.     -Infection is improving: MET.      /68 (BP Location: Right arm)   Pulse 79   Temp 98.7  F (Oral)   Resp 16   LMP 12/09/2024 (Exact Date)   SpO2 100%

## 2025-01-13 VITALS
BODY MASS INDEX: 44.48 KG/M2 | OXYGEN SATURATION: 100 % | HEART RATE: 76 BPM | SYSTOLIC BLOOD PRESSURE: 117 MMHG | DIASTOLIC BLOOD PRESSURE: 66 MMHG | RESPIRATION RATE: 16 BRPM | WEIGHT: 265.65 LBS | TEMPERATURE: 98.2 F

## 2025-01-13 LAB — PHOSPHATE SERPL-MCNC: 3.7 MG/DL (ref 2.5–4.5)

## 2025-01-13 PROCEDURE — 250N000013 HC RX MED GY IP 250 OP 250 PS 637: Performed by: STUDENT IN AN ORGANIZED HEALTH CARE EDUCATION/TRAINING PROGRAM

## 2025-01-13 PROCEDURE — 250N000013 HC RX MED GY IP 250 OP 250 PS 637: Performed by: PHYSICIAN ASSISTANT

## 2025-01-13 PROCEDURE — 99239 HOSP IP/OBS DSCHRG MGMT >30: CPT | Mod: FS | Performed by: STUDENT IN AN ORGANIZED HEALTH CARE EDUCATION/TRAINING PROGRAM

## 2025-01-13 PROCEDURE — 99207 PR APP CREDIT; MD BILLING SHARED VISIT: CPT | Mod: FS | Performed by: PHYSICIAN ASSISTANT

## 2025-01-13 PROCEDURE — 250N000013 HC RX MED GY IP 250 OP 250 PS 637

## 2025-01-13 PROCEDURE — 36415 COLL VENOUS BLD VENIPUNCTURE: CPT | Performed by: STUDENT IN AN ORGANIZED HEALTH CARE EDUCATION/TRAINING PROGRAM

## 2025-01-13 PROCEDURE — 84100 ASSAY OF PHOSPHORUS: CPT | Performed by: STUDENT IN AN ORGANIZED HEALTH CARE EDUCATION/TRAINING PROGRAM

## 2025-01-13 RX ORDER — ONDANSETRON 4 MG/1
4 TABLET, ORALLY DISINTEGRATING ORAL EVERY 6 HOURS PRN
Qty: 15 TABLET | Refills: 0 | Status: SHIPPED | OUTPATIENT
Start: 2025-01-13

## 2025-01-13 RX ORDER — LISDEXAMFETAMINE DIMESYLATE 70 MG/1
70 CAPSULE ORAL DAILY
Status: DISCONTINUED | OUTPATIENT
Start: 2025-01-13 | End: 2025-01-13 | Stop reason: HOSPADM

## 2025-01-13 RX ADMIN — MINOXIDIL 2.5 MG: 2.5 TABLET ORAL at 09:37

## 2025-01-13 RX ADMIN — POTASSIUM & SODIUM PHOSPHATES POWDER PACK 280-160-250 MG 2 PACKET: 280-160-250 PACK at 05:57

## 2025-01-13 RX ADMIN — POTASSIUM & SODIUM PHOSPHATES POWDER PACK 280-160-250 MG 2 PACKET: 280-160-250 PACK at 02:55

## 2025-01-13 RX ADMIN — Medication 125 MCG: at 09:07

## 2025-01-13 RX ADMIN — LEVOTHYROXINE SODIUM 50 MCG: 0.05 TABLET ORAL at 09:07

## 2025-01-13 RX ADMIN — LISDEXAMFETAMINE DIMESYLATE 70 MG: 70 CAPSULE ORAL at 10:36

## 2025-01-13 RX ADMIN — VENLAFAXINE HYDROCHLORIDE 75 MG: 75 CAPSULE, EXTENDED RELEASE ORAL at 09:37

## 2025-01-13 ASSESSMENT — ACTIVITIES OF DAILY LIVING (ADL)
ADLS_ACUITY_SCORE: 55
DEPENDENT_IADLS:: INDEPENDENT
ADLS_ACUITY_SCORE: 55

## 2025-01-13 NOTE — PROGRESS NOTES
19:00 - 07:00     NEURO: WDL  RESPIRATORY: WDL   CARDIAC: WDL  SKIN: WDL  GI/:  WDL X; Abdominal discomfort and diarrhea   PAIN/NAUSEA: Abdominal discomfort with mild pain (no pain meds asked for this shift). Intermittent nausea (no antiemetics asked for this shift).   IV ACCESS: R PIV  DIET: Regular   ACTIVITY: SBA w/ weakness, uses IV pole for stability.   LAB: Need stool collect (patient aware)   PLAN: Continue fluids. Collect stool. Manage pain and nausea.    Goal Outcome Evaluation:    Overall Patient Progress: improvingOverall Patient Progress: improving

## 2025-01-13 NOTE — PROGRESS NOTES
Shift Report [id number]      /66 (BP Location: Right arm)   Pulse 76   Temp 98.2  F (36.8  C) (Oral)   Resp 16   Wt 120.5 kg (265 lb 10.5 oz)   LMP 12/09/2024 (Exact Date)   SpO2 100%   BMI 44.48 kg/m        Neuro: A/O x4, able to make needs known  HEENT: WDL  Vitals: Vitally stable, Afebrile.   Pain: Rates 3/10 with eating, pt declined pain medication.   Resp: LS clear, no SOB on room air.  GI/: Continent of bowel and bladder. No BM this shift. Tolerating PO intake.  Cardio/Tele/Rhythm: Apical pulse regular, less than 3 sec cap refill.   Skin: Warm/Dry/Intact  Lines/Daines: 1  R PIV/infusing  mL/hr  Diet/Accuchecks: Reg  Activity: Up ad leah   Recommendations/Plan: Continue POC.

## 2025-01-13 NOTE — CONSULTS
Care Management Initial Consult    General Information  Assessment completed with: PatientEllis  Type of CM/SW Visit: Initial Assessment    Primary Care Provider verified and updated as needed: Yes (Nohemi Aguilar [phone])   Readmission within the last 30 days: no previous admission in last 30 days      Reason for Consult: discharge planning  Advance Care Planning: Advance Care Planning Reviewed: no concerns identified          Communication Assessment  Patient's communication style: spoken language (English or Bilingual)    Hearing Difficulty or Deaf: no        Cognitive  Cognitive/Neuro/Behavioral: WDL                      Living Environment:   People in home: alone     Current living Arrangements: apartment      Able to return to prior arrangements: yes     Family/Social Support:  Care provided by: self  Provides care for: no one  Marital Status: Single  Support system: Other (specify) (Pt said he has no support system.)          Description of Support System: Other (see comments)    Support Assessment: Limited social contact and support    Current Resources:   Patient receiving home care services: No     Community Resources: None  Equipment currently used at home: none  Supplies currently used at home: None    Employment/Financial:  Employment Status: employed full-time        Financial Concerns: other (see comments) (Pt reported he is declining physically and does not know how long he will continue to work.,)   Referral to Financial Worker: No       Does the patient's insurance plan have a 3 day qualifying hospital stay waiver?  No    Lifestyle & Psychosocial Needs:  Social Drivers of Health     Food Insecurity: Low Risk  (1/12/2025)    Food Insecurity     Within the past 12 months, did you worry that your food would run out before you got money to buy more?: No     Within the past 12 months, did the food you bought just not last and you didn t have money to get more?: No   Depression: Not at  risk (12/4/2024)    PHQ-2     PHQ-2 Score: 2   Recent Concern: Depression - Risk of Self Harm (11/12/2024)    Received from [facility]    Depression (PHQ-9)     Last PHQ-9 Score: Not on file     Thoughts of self harm: More than half the days   Housing Stability: High Risk (1/12/2025)    Housing Stability     Do you have housing? : Yes     Are you worried about losing your housing?: Yes   Tobacco Use: Low Risk  (12/4/2024)    Patient History     Smoking Tobacco Use: Never     Smokeless Tobacco Use: Never     Passive Exposure: Not on file   Financial Resource Strain: Low Risk  (1/12/2025)    Financial Resource Strain     Within the past 12 months, have you or your family members you live with been unable to get utilities (heat, electricity) when it was really needed?: No   Alcohol Use: Not At Risk (11/6/2023)    Received from [facility]    AUDIT-C     Frequency of Alcohol Consumption: Monthly or less     Average Number of Drinks: Patient does not drink     Frequency of Binge Drinking: Never   Transportation Needs: Low Risk  (1/12/2025)    Transportation Needs     Within the past 12 months, has lack of transportation kept you from medical appointments, getting your medicines, non-medical meetings or appointments, work, or from getting things that you need?: No   Physical Activity: Insufficiently Active (11/6/2023)    Received from [facility] and [facility]    Exercise Vital Sign     Days of Exercise per Week: 2 days     Minutes of Exercise per Session: 20 min   Interpersonal Safety: Low Risk  (10/30/2024)    Interpersonal Safety     Do you feel physically and emotionally safe where you currently live?: Patient unable to answer     Within the past 12 months, have you been hit, slapped, kicked or otherwise physically hurt by someone?: No     Within the past 12 months, have you been humiliated or emotionally abused in other ways by your partner or ex-partner?: No    Stress: Stress Concern Present (11/6/2023)    Received from [facility] and [facility]    [employer] [place] [employer] - Occupational Stress Questionnaire     Feeling of Stress : Very much   Social Connections: Socially Isolated (11/6/2023)    Received from [facility]    Social Connection and Isolation Panel [NHANES]     Frequency of Communication with Friends and Family: Once a week     Frequency of Social Gatherings with Friends and Family: Never     Attends Holiness Services: Never     Active Member of Clubs or Organizations: Yes     Attends Club or Organization Meetings: More than 4 times per year     Marital Status: Never    Health Literacy: Not on file     Functional Status:  Prior to admission patient needed assistance:   Dependent ADLs:: Independent  Dependent IADLs:: Independent    Mental Health Status:     Mental Health Management: Medication, Individual Therapy, Psychiatrist    Chemical Dependency Status:  Chemical Dependency Status: No Current Concerns           Values/Beliefs:  Spiritual, Cultural Beliefs, Holiness Practices, Values that affect care: no             Discussed  Partnership in Safe Discharge Planning  document with patient/family: No    Additional Information:  SW received consult for financial questions. SW met with pt at bedside and introduced self, role, and explained purpose of assessment.SW verified PCP, address, and contact and pt requested for Amy to be removed from contact and did not provide another contact.  Pt reported he lives alone in an apartment and is independent with I/ADLs. Pt shared he noticed he is declining physically and  is afraid of losing his full time employment. Pt stated that he had mental health  at [facility]  and asked to transfer it to [facility] since he moved to [place]. Pt shared he has not heard from anyone and needs help with contacting new . Pt reported he has no  support system and knows [facility] assessment is 6 months out and cannot wait. YELENA called [facility] human services and they said they cannot provide information on pt and for him to call them. YELENA called [facility] adult mental health and they did not answer. YELENA provided  pt with contact information for the two [facility].     Next Steps:     No further care management intervention anticipated at this time. Please re-consult if further needs arise. Care management signing off.   ROSE Sheffield, LGSW  OBS/ED   Phone: [phone]  Fax: [phone]    Vocera: 1A Obs YLEENA

## 2025-01-13 NOTE — PROGRESS NOTES
"Ellis is a 24 year old who is being evaluated via a billable video visit.       Assessment & Plan     Hospital discharge follow-up  After acute GI illness. Overall doing well, no refills, changes, follow up labs needed.     Orthostatic intolerance  Positive [employer] lean test though in the setting of multiple meds that could affect results. Echo and Zio both normal/reassuring. Recommended options for home HR and BP monitoring, pt gets a lot of discomfort from BP cuff so less interested in a home cuff. Will consider option for HR monitoring.     Migraine with aura and without status migrainosus, not intractable  Chronic fatigue  Discussed stepwise workup to continue to evaluate for causes of chronic fatigue. Additional labs - will schedule at lab. Consider further AI workup if inflammatory markers elevated. Could also consider sleep study in the future.   - Ferritin; Future  - Iron & Iron Binding Capacity; Future  - Transferrin; Future  - Folate; Future  - Cortisol; Future  - CRP inflammation; Future  - Erythrocyte sedimentation rate auto; Future    Fatty liver  - Lipid panel reflex to direct LDL Fasting; Future    Myalgia  - CK total; Future          BMI  Estimated body mass index is 44.48 kg/m  as calculated from the following:    Height as of 12/4/24: 1.646 m (5' 4.8\").    Weight as of 1/12/25: 120.5 kg (265 lb 10.5 oz).       Depression Screening Follow Up        1/14/2025     9:19 AM   PHQ   PHQ-9 Total Score 16    Q9: Thoughts of better off dead/self-harm past 2 weeks Several days   F/U: Thoughts of suicide or self-harm Yes   F/U: Self harm-plan No   F/U: Self-harm action No   F/U: Safety concerns No       Patient-reported      Follow Up Actions Taken  Crisis resource information provided in the After Visit Summary  Referred patient back to mental health provider -- follows w Psychiatry        No follow-ups on file.    Subjective   Ellis is a 24 year old, presenting for the following health issues:  hospital " "follow up (Hospital follow up - feeling better today than Friday - still trying to rest)    HPI     Made a form to fill out every day to keep track of symptoms. Just to keep tabs on what they are able to do and where is the worst of their symptoms, quality of life, etc.     Realizing that they need to get  set up and their [place] for [facility] [place] is 6-8 months out   - talked to SW in the hospital, largely unhelpful. Maybe helpful to notify previous county. Now living in [place]. [place]n't called to schedule [place] [facility] [place] yet   - there's a way to expedite it if a medical provider vouched for a rapid decline in 45 days     Symptom tracker form -- very detailed!  QOL checks: Tracking ability to perform ADLs: 10 is normal, 5 is baseline last year, 0 is near impossible / makes them more sick to do them   Only scoring 0-2  Fatigue is extremely severe  Stiffness/numbness/tingling/neuropathy   Dizziness/lightheadedness usually severe     Doesn't like having blood pressure taken     Has had feet change color -- flushed/red when standing for long periods of time     Would be curious to try compression stuff but hesitant because even wearing loose clothing, pressure on any part of the body is uncomfortable at best   - doesn't really wear tighter fitting pants anymore bc having that amount of pressure on their body is painful   - also probably why the blood pressure cuff hurts     Has been doing a pretty good job of getting through water bottle -- has been getting through more than 32oz/day   Just plain water     \"I feel in my gut that I probably have ME/CFS and I know that there is a lot of diagnostic work that needs to go into having that on my record. I do want to rule out as many things as possible before looking at what the best treatment is.\"     HR: can't tell them what options are available. Doesn't know what accommodations they're willing to supply.   They let them try a wheelchair " for a little bit, not one that's meant to be self-pushed so getting around didn't really help and shifting his posture a lot to propel himself in it didn't do anything to help w/ the orthostatic intolerance. Returned it after 2 days.     Thinks they need either a longer or permanent DES from work   And can't afford that   All of their energy is going to work. Can't shower or bathe without it taking them out for a couple hours afterwards.      Work M-Thurs 10a-6:30p and Fridays 8a-4:30pm          Objective           Vitals:  No vitals were obtained today due to virtual visit.    Physical Exam   GENERAL: alert and no distress. Appears fatigued.  EYES: Eyes grossly normal to inspection.  No discharge or erythema, or obvious scleral/conjunctival abnormalities.  RESP: No audible wheeze, cough, or visible cyanosis.    SKIN: Visible skin clear. No significant rash, abnormal pigmentation or lesions.  NEURO: Cranial nerves grossly intact.  Mentation and speech appropriate for age.  PSYCH: Appropriate affect, tone, and pace of words          Video-Visit Details    Type of service:  Video Visit   Originating Location (pt. Location): Home    Distant Location (provider location):  On-site  Platform used for Video Visit: Marisela  Signed Electronically by: Nohemi Aguilar DO

## 2025-01-13 NOTE — PROGRESS NOTES
DISCHARGE                          ----------------------------------------------------------------------------  Discharged to: Home  Via: Automobile  Accompanied by: Friend  Discharge Instructions: diet, activity, medications, follow up appointments, when to call the MD, aftercare instructions.  Prescriptions: To be filled by  Discharge pharmacy.  Follow Up Appointments: information given  Belongings: All sent with pt  IV: removed  Telemetry: off    Pt exhibits understanding of above discharge instructions; all questions answered.    Discharge Paperwork: sent home with patient.

## 2025-01-13 NOTE — PROGRESS NOTES
SHIFT: [id number]     Neuro: A&Ox4. Able to make needs known. Behavior appropriate to situation.   Cardiac: WDL. Denies CP/SOB.           Respiratory: Breathing comfortably on room air.  GI/: 1 episode loose stool this AM. Stool sample needed. Endorses mild abdominal discomfort.   Diet/appetite: Diet well tolerated. Appetite fair.  Activity: Adjusted per tolerance. Ambulated outside of room. See PT note.  Pain: Denies pain.  Skin: No new skin issues noted.    Plan: Collect stool sample when able.

## 2025-01-13 NOTE — PROGRESS NOTES
Shift Report [id number]      /66 (BP Location: Right arm)   Pulse 76   Temp 98.2  F (36.8  C) (Oral)   Resp 16   Wt 120.5 kg (265 lb 10.5 oz)   LMP 12/09/2024 (Exact Date)   SpO2 100%   BMI 44.48 kg/m            Neuro: A/O x4, able to make needs known  HEENT: WDL  Vitals: Vitally stable, Afebrile.   Pain: Rates 3/10 with eating, pt declined pain medication.   Resp: LS clear, no SOB on room air.  GI/: Continent of bowel and bladder. No BM this shift.  Cardio/Tele/Rhythm: Apical pulse regular, less than 3 sec cap refill.   Skin: Warm/Dry/Intact  Lines/Daines: 1  R PIV/infusing  mL/hr  Diet/Accuchecks: Reg  Activity: Up ad leah   Recommendations/Plan: Continue POC.

## 2025-01-14 ENCOUNTER — VIRTUAL VISIT (OUTPATIENT)
Dept: FAMILY MEDICINE | Facility: CLINIC | Age: 25
End: 2025-01-14
Payer: COMMERCIAL

## 2025-01-14 DIAGNOSIS — Z09 HOSPITAL DISCHARGE FOLLOW-UP: Primary | ICD-10-CM

## 2025-01-14 DIAGNOSIS — I95.1 ORTHOSTATIC INTOLERANCE: ICD-10-CM

## 2025-01-14 DIAGNOSIS — G43.109 MIGRAINE WITH AURA AND WITHOUT STATUS MIGRAINOSUS, NOT INTRACTABLE: ICD-10-CM

## 2025-01-14 DIAGNOSIS — M79.10 MYALGIA: ICD-10-CM

## 2025-01-14 DIAGNOSIS — R53.82 CHRONIC FATIGUE: ICD-10-CM

## 2025-01-14 DIAGNOSIS — K76.0 FATTY LIVER: ICD-10-CM

## 2025-01-14 ASSESSMENT — PATIENT HEALTH QUESTIONNAIRE - PHQ9
10. IF YOU CHECKED OFF ANY PROBLEMS, HOW DIFFICULT HAVE THESE PROBLEMS MADE IT FOR YOU TO DO YOUR WORK, TAKE CARE OF THINGS AT HOME, OR GET ALONG WITH OTHER PEOPLE: SOMEWHAT DIFFICULT
SUM OF ALL RESPONSES TO PHQ QUESTIONS 1-9: 16
SUM OF ALL RESPONSES TO PHQ QUESTIONS 1-9: 16

## 2025-01-14 NOTE — Clinical Note
Hello! Wondering if you have capacity to reach out to this pt via [employer] (their preference over a call) next week -- they are navigating chronic illness/disability and said [facility] assessment is booking many months out which I'd heard from y'all previously and affirmed that they should still pursue it. They live in [place] and were going ot try to go to  at the [facility] this week to connect directly w/ a worker there. So essentially just hoping you can message to make sure they don't have any additional SW asks/needs after (hopefully) connecting w/ [facility]

## 2025-01-17 NOTE — PATIENT INSTRUCTIONS
Will have Alan our  message you next week after you hopefully have a chance to connect / [facility] social work/services at the [facility].     We talked through different potential options for how you could ask for accommodations at work. I suggested some potential things like:   - option to work remote, whether that's scheduled regularly on set days/week or an option to flex to [facility] if having bad symptoms   - physical restrictions around how much you walk/stand, stating you have to be allowed to sit or change position with a certain frequency, or limiting frequency of position changes  - need to have access to food/snacks w/ a certain frequency, stuff like that   - limiting the # of hours you work in a day or # of days/week, specifying the start time of work     I'll message next week to see if you've had a chance to think more about it / have a sense of what will be helpful and what we should ask for in the paperwork     I ordered labs that can be done at any [facility] location as long as you schedule a lab time. Should be done before 9am to check AM cortisol

## 2025-01-20 ENCOUNTER — LAB (OUTPATIENT)
Dept: LAB | Facility: CLINIC | Age: 25
End: 2025-01-20
Payer: COMMERCIAL

## 2025-01-20 DIAGNOSIS — R53.82 CHRONIC FATIGUE: ICD-10-CM

## 2025-01-20 DIAGNOSIS — K76.0 FATTY LIVER: ICD-10-CM

## 2025-01-20 DIAGNOSIS — M79.10 MYALGIA: ICD-10-CM

## 2025-01-20 LAB
CHOLEST SERPL-MCNC: 192 MG/DL
CK SERPL-CCNC: 46 U/L (ref 26–308)
CORTIS SERPL-MCNC: 16.4 UG/DL
CRP SERPL-MCNC: 6.49 MG/L
ERYTHROCYTE [SEDIMENTATION RATE] IN BLOOD BY WESTERGREN METHOD: 3 MM/HR (ref 0–20)
FASTING STATUS PATIENT QL REPORTED: YES
FERRITIN SERPL-MCNC: 103 NG/ML (ref 6–409)
FOLATE SERPL-MCNC: 8.5 NG/ML (ref 4.6–34.8)
HDLC SERPL-MCNC: 28 MG/DL
IRON BINDING CAPACITY (ROCHE): 313 UG/DL (ref 240–430)
IRON SATN MFR SERPL: 19 % (ref 15–46)
IRON SERPL-MCNC: 61 UG/DL (ref 37–157)
LDLC SERPL CALC-MCNC: 122 MG/DL
NONHDLC SERPL-MCNC: 164 MG/DL
TRANSFERRIN SERPL-MCNC: 253 MG/DL (ref 200–360)
TRIGL SERPL-MCNC: 208 MG/DL

## 2025-01-20 PROCEDURE — 80061 LIPID PANEL: CPT

## 2025-01-20 PROCEDURE — 82533 TOTAL CORTISOL: CPT

## 2025-01-20 PROCEDURE — 86140 C-REACTIVE PROTEIN: CPT

## 2025-01-20 PROCEDURE — 84466 ASSAY OF TRANSFERRIN: CPT

## 2025-01-20 PROCEDURE — 83540 ASSAY OF IRON: CPT

## 2025-01-20 PROCEDURE — 82746 ASSAY OF FOLIC ACID SERUM: CPT

## 2025-01-20 PROCEDURE — 82550 ASSAY OF CK (CPK): CPT

## 2025-01-20 PROCEDURE — 85652 RBC SED RATE AUTOMATED: CPT

## 2025-01-20 PROCEDURE — 36415 COLL VENOUS BLD VENIPUNCTURE: CPT

## 2025-01-20 PROCEDURE — 82728 ASSAY OF FERRITIN: CPT

## 2025-01-29 ENCOUNTER — THERAPY VISIT (OUTPATIENT)
Dept: OCCUPATIONAL THERAPY | Facility: CLINIC | Age: 25
End: 2025-01-29
Attending: STUDENT IN AN ORGANIZED HEALTH CARE EDUCATION/TRAINING PROGRAM
Payer: COMMERCIAL

## 2025-01-29 DIAGNOSIS — Z78.9 IMPAIRED MOBILITY AND ADLS: ICD-10-CM

## 2025-01-29 DIAGNOSIS — Z74.09 IMPAIRED MOBILITY AND ADLS: ICD-10-CM

## 2025-01-29 PROCEDURE — 97542 WHEELCHAIR MNGMENT TRAINING: CPT | Mod: GO | Performed by: OCCUPATIONAL THERAPIST

## 2025-01-29 NOTE — PROGRESS NOTES
"                                                                               SEATING AND WHEELED MOBILITY ASSESSMENT    [facility]  Occupational Therapy   Date of service: January 29, 2025    Referring provider:   Nohemi Aguilar DO     Order Date 12/4/24  Onset Date: 12/4/24    Order Details: [email or web address] [email or web address]    Funding:[employer] [place]    Vendor: [employer]    Height/Weight: 5'4\" / 265    Medical diagnosis:   Nervous and Auditory  Right knee pain  Generalized abdominal pain     Respiratory  Epistaxis  Exercise-induced asthma  Mild persistent asthma with exacerbation     Digestive  Constipation  Morbid obesity (H)  Vitamin D deficiency  Nausea and vomiting, unspecified vomiting type     Endocrine  Hypertriglyceridemia  Hypothyroidism  Prediabetes  Thyroid nodule  Dehydration     Musculoskeletal and Integumentary  Osgood-Schlatter's disease  Facet arthropathy, lumbar     Hematologic  Iron deficiency anemia     Behavioral  Attention deficit hyperactivity disorder (ADHD), combined type  Gender dysphoria in adult  Moderate major depression (H)  PTSD (post-traumatic stress disorder)  Anxiety disorder  Recurrent major depression in partial remission  Autism spectrum     Other  Wears glasses  Gender dysphoria in adolescent and adult       Cardio-respiratory status:  inhaler     Pertinent medical history/surgery:  Per EMR \"Lincoln E Domka (he/him or they/them) is a 24 year old adult admitted on 1/10/2025. He has a past medical history of prediabetes, asthma, hypothyroidism, chronic fatigue, migrainea, MDD, ADHD, gender affirming care. They presented to the ED with intractable nausea and vomiting. Admitted to internal medicine for IV fluids and observation. \"     Patient concerns/goals: wheelchair    Living environment:  Apartment    Living environment barriers:  1-4 stairs to enter (1 railing present)   7 stairs to basement     Current assistance/living environment:  Lives " UTI, pyelonephritis, kidney stones, MSK back pain alone    Community Mobility:  Transportation: Van  Community Mobility Requirements: Work, 40+ hours a week at [employer]  Accessibility Requirements for Community Settings: very challenging to get in/out of building      Current mobility equipment:  Standard cane(s)    Fall Risk Screen:   Has the patient fallen 2 or more times in the last year? No      Has the patient fallen and had an injury in the past year? No       25 ft walk: 9 second with wide BERTIN and foot slap    Is the patient a fall risk? No    ADL: Waiting for assessment for pca services in [facility] but will pay friend 1-2 days a week who will aids PRN  Feeding self:  ind  Grooming: ind  UE Dressing:  ind  LE Dressing:  ind  Showering/bathing: ind with assist for hair with shower chair  Toileting/transfer:  ind  Functional Mobility: cane PRN    IADL:    Medications: ind  Meals: microwave meals due to limited energy  Home management:  gets helps  Driving:  ind but challenges with orthostatic dysfunction  Occupation: [employer] tech    Evaluation     Pain assessment:  Mid back to thighs 4-6     Cognition:  reports challenges in thinking due to fatigue    Vision: glass, blurry with orthostatic dysfunction    Hearing: wnl    Fatigue:  Daily impact on function.  Visual disturbances with distortion and tunnel type vision.  Cognation is jakob impacted with increased fatigue. Disorientation with fatigue and challenges with focus.  Works full time and does not have energy to complete tasks after work and weekend is mostly catch up.     Ambulation:  Level of [place]: Independent    Coordination:  Slowed and slight tremor    Sensation:  Sensory Deficits Reported: numbness noted in hands, abdomen down has reduced sensations.    Head and Neck:  Head and Neck Position: WFL    Upper Extremities  UE ROM: WFL but guarded  UE Strength:  4/5  Dominance: Right      Lower Extremities:  LE ROM: WFL  LE Strength:  4/5    Impairments:  Fatigue  Muscle  atrophy  Coordination  Balance  Pain  Range of motion     Treatment diagnosis:  Impaired mobility  Impaired activities of daily living     Recommendations/Plan of care:  Patient would benefit from interventions to enhance safety and independence.  Rehab potential good for stated goals.  Occupational therapy intervention for  wheelchair management.    Goals:   Target date:   Patient, family and/or caregiver will verbalize/demonstrate understanding of compensatory methods /equipment to enhance functional independence and safety.    Educational assessment/barriers to learning:  Cognitive    Treatment provided this date:   Wheelchair management, 45 minutes   Determined need for scooter due to pain and chronic fatigue limiting function and participation in paid work.  Safe trials today in clinic. Although there are stairs to get into his building, friends/PCA can take apart to get inside and he will mostly keep in locked van parked outside his apartment.     Univision 4 wheel Power Operated Vehicle / Scooter -  This device is being requested for this patient with mobility impairments to allow him to be able to complete all of his mobility related activities of daily living in a safe fashion, without risk of injury from falling, and in a reasonable time frame. He demonstrated during the home evaluation that he was able to transfer to/from the requested scooter, operate the tiller steering system, able to maintain postural stability and position while operating the POV, and operate the on/off mechanism and the speed dial appropriately and safely. They are very willing and physically / cognitively able to use the recommended equipment to assist with mobility related activities of daily living and general mobility. There is a mobility limitation that cannot be sufficiently and safely resolved by the use of an appropriately fitted cane or walker and they do not have sufficient upper extremity function to self-propel an  optimally-configured manual wheelchair in their home to perform MRADLs during a typical day due to limitations in strength, endurance, range of motion, and coordination. This equipment is reasonable and necessary with reference to accepted standards of medical practice and treatment of this patient's condition and is not being recommended as a convenience item. Without this recommended equipment, he is highly likely to sustain injuries from falls, develop pressure sores or postural compensation, and/or be bed confined, which those costs far exceed the cost of the requested equipment.    This therapist has no financial connection to the equipment being requested or vendor being used.    I have read and concur with the above recommendations.  Physician Printed Name __________________________________________  Physician Signature  _____________________________________________  Date of Signature ______________________________  Physician Phone  ______________________________      Response to treatment/recommendations: thankful    Goal attainment:  All goals met    Risks and benefits of evaluation/treatment have been explained.  Patient, family and/or caregiver are in agreement with Plan of Care.     Timed Code Treatment Minutes: 45  Total Treatment Time (sum of timed and untimed services): 45    Electronically signed by:  Michelle WYATT, ATP      Occupational Therapist, Assistive   [phone]      fax: [phone]      [email or web address], [address]  [place] [address]  January 29, 2025       Differential Diagnosis

## 2025-02-06 ENCOUNTER — DOCUMENTATION ONLY (OUTPATIENT)
Dept: FAMILY MEDICINE | Facility: CLINIC | Age: 25
End: 2025-02-06
Payer: COMMERCIAL

## 2025-02-06 NOTE — PROGRESS NOTES
"When opening a documentation only encounter, be sure to enter in \"Chief Complaint\" Forms and in \" Comments\" Title of form, description if needed.    Ellis is a 24 year old  adult  Form received via: Fax  Form now resides in: Provider Ready    Deepali Preciado, CAMILLA               "

## 2025-02-25 ENCOUNTER — DOCUMENTATION ONLY (OUTPATIENT)
Dept: FAMILY MEDICINE | Facility: CLINIC | Age: 25
End: 2025-02-25
Payer: COMMERCIAL

## 2025-03-03 ENCOUNTER — VIRTUAL VISIT (OUTPATIENT)
Dept: SLEEP MEDICINE | Facility: CLINIC | Age: 25
End: 2025-03-03
Attending: FAMILY MEDICINE
Payer: COMMERCIAL

## 2025-03-03 VITALS — BODY MASS INDEX: 44.15 KG/M2 | HEIGHT: 65 IN | WEIGHT: 265 LBS

## 2025-03-03 DIAGNOSIS — R06.83 SNORING: ICD-10-CM

## 2025-03-03 DIAGNOSIS — Z72.820 POOR SLEEP: ICD-10-CM

## 2025-03-03 DIAGNOSIS — F51.04 PSYCHOPHYSIOLOGICAL INSOMNIA: ICD-10-CM

## 2025-03-03 DIAGNOSIS — Z72.821 INADEQUATE SLEEP HYGIENE: ICD-10-CM

## 2025-03-03 DIAGNOSIS — G47.21 DELAYED SLEEP PHASE SYNDROME: ICD-10-CM

## 2025-03-03 DIAGNOSIS — G25.81 RESTLESS LEGS SYNDROME (RLS): Primary | ICD-10-CM

## 2025-03-03 DIAGNOSIS — R53.82 CHRONIC FATIGUE: ICD-10-CM

## 2025-03-03 PROCEDURE — 98003 SYNCH AUDIO-VIDEO NEW HI 60: CPT | Performed by: INTERNAL MEDICINE

## 2025-03-03 RX ORDER — MULTIVIT WITH MINERALS/LUTEIN
250 TABLET ORAL DAILY
Qty: 90 TABLET | Refills: 0 | Status: SHIPPED | OUTPATIENT
Start: 2025-03-03 | End: 2025-06-01

## 2025-03-03 RX ORDER — FERROUS SULFATE 325(65) MG
325 TABLET ORAL 2 TIMES DAILY
Qty: 180 TABLET | Refills: 0 | Status: SHIPPED | OUTPATIENT
Start: 2025-03-03 | End: 2025-06-01

## 2025-03-03 ASSESSMENT — SLEEP AND FATIGUE QUESTIONNAIRES
HOW LIKELY ARE YOU TO NOD OFF OR FALL ASLEEP IN A CAR, WHILE STOPPED FOR A FEW MINUTES IN TRAFFIC: WOULD NEVER DOZE
HOW LIKELY ARE YOU TO NOD OFF OR FALL ASLEEP WHILE SITTING INACTIVE IN A PUBLIC PLACE: SLIGHT CHANCE OF DOZING
HOW LIKELY ARE YOU TO NOD OFF OR FALL ASLEEP WHEN YOU ARE A PASSENGER IN A CAR FOR AN HOUR WITHOUT A BREAK: WOULD NEVER DOZE
HOW LIKELY ARE YOU TO NOD OFF OR FALL ASLEEP WHILE SITTING AND TALKING TO SOMEONE: SLIGHT CHANCE OF DOZING
HOW LIKELY ARE YOU TO NOD OFF OR FALL ASLEEP WHILE SITTING QUIETLY AFTER LUNCH WITHOUT ALCOHOL: MODERATE CHANCE OF DOZING
HOW LIKELY ARE YOU TO NOD OFF OR FALL ASLEEP WHILE SITTING AND READING: SLIGHT CHANCE OF DOZING
HOW LIKELY ARE YOU TO NOD OFF OR FALL ASLEEP WHILE WATCHING TV: SLIGHT CHANCE OF DOZING
HOW LIKELY ARE YOU TO NOD OFF OR FALL ASLEEP WHILE LYING DOWN TO REST IN THE AFTERNOON WHEN CIRCUMSTANCES PERMIT: SLIGHT CHANCE OF DOZING

## 2025-03-03 ASSESSMENT — PATIENT HEALTH QUESTIONNAIRE - PHQ9: SUM OF ALL RESPONSES TO PHQ QUESTIONS 1-9: 16

## 2025-03-03 ASSESSMENT — PAIN SCALES - GENERAL: PAINLEVEL_OUTOF10: MODERATE PAIN (5)

## 2025-03-03 NOTE — PATIENT INSTRUCTIONS
"DELAYED SLEEP PHASE  What is it?   Delayed sleep phase disorder (DSP) is a circadian rhythm disorder. It consists of a typical sleep pattern that is \"delayed\" by two or more hours. This delay occurs when one s internal sleep clock (circadian rhythm) is shifted later at night and later in the morning. Once sleep occurs, the sleep is generally normal. But the delay leads to a pattern of sleep that is later than what is desired or what is considered socially acceptable. This pattern can be a problem when it interferes with work or social demands.  A person with DSP is likely to prefer late bedtimes and late wake-up times. When left to his or her own schedule, a person with DSP is likely to have a normal amount and quality of sleep. It simply occurs at a delayed time. One sign of this disorder is difficulty falling asleep until late at night. Another sign is having a hard time getting out of bed in the morning for work or school. These signs can make DSP look like insomnia. Daytime functioning can be severely impaired by DSP. It can lead to excessive sleepiness and fatigue. When able to sleep on their own schedules, people with DSP often stay up until they get tired and then sleep until they awaken late in the morning. In this case, they tend to have no complaint of difficulty falling to sleep or feeling poorly during the day.         Who gets it?   The exact rate of DSP is unknown in the general population. It is much more common in teens and young adults. From 7% to 16% of them may have it. DSP is likely to be found in 10% of people with a complaint of chronic insomnia. People who tend to be \"evening types\" or \"night owls\" are likely to develop DSP.     There is likely to be some genetic component. Some environmental factors may also be involved. A lack of exposure to morning sunlight may make it worse. Too much exposure to bright evening sunlight may also increase symptoms of DSP. A family history of DSP is common in " about 40% of people with the disorder.     How do I know if I have it?   Is there a delay in your sleep pattern in relation to your desired sleep and wake times? Do you have trouble falling asleep at the desired time of night? Are you then unable to awaken at the desired or socially acceptable time?   When left to your own sleep schedule, do you have a normal duration and quality of sleep? Does this sleep occur in a stable, but delayed time period in relation to what is desired or socially acceptable?   Have you had this kind of stable but delayed sleep time for at least seven days?   If you answered  yes  to these questions, then you may have delayed sleep phase disorder.  It is also important to know if there is something else that is causing your sleep problems. They may be a result of one of the following:  Another sleep disorder   A medical condition   Medication use   A mental health disorder   Substance abuse            Do I need to see a sleep specialist?       LIGHT THERAPY  What is it?  Light therapy is a treatment used for people who suffer from circadian rhythm sleep disorders. Your body has an internal clock that tells it when it is time to be asleep and when it is time to be awake.     This clock is located in the brain just above an area where the nerves travel to the eyes. This area is called the SCN. Your clock controls the  circadian rhythms  in your body. These rhythms include body temperature, alertness and the daily cycle of many hormones.     The word  circadian  means to occur in a cycle of about 24 hours. Circadian rhythms make you feel sleepy or alert at regular times every day. Some people have a circadian rhythm sleep disorder. This causes their natural sleep time to overlap with regular awake activities such as work or school.   Among other factors, your clock is  set  by your exposure to bright light such as sunlight. Exposure to bright light or  light therapy  is one method used to  treat people with a circadian rhythm sleep disorder.     The goal for treating patients who have circadian rhythm problems is to combine a healthy sleep pattern with an internal clock that is set at the right time. This will allow them to enjoy the benefits of good sleep.     Light therapy can help someone  re-set  a clock that is off. Regular sleep patterns help to keep the clock set at the new time. Light therapy is only part of a treatment plan that should be guided by a doctor who is familiar with sleep disorders.   Light therapy is used to expose your eyes to intense but safe amounts of light for a specific and regular length of time. In many places, sunlight is not available at the proper time to be used as treatment.     Artificial light may be used to affect the body clock in the same way that sunlight does. New advances continue to be made in this field. Currently, products that are used for light therapy fit into four basic groups:   1. Light Box   This is the most common tool that is used in light therapy. The box houses several tubes that produce extremely bright light. It sits on top of a table or desk and plugs into the wall.     During a treatment session, you have to keep within a certain distance of the box. Usually, you will be about 18 to 24 inches away from it. It does not require you to look directly into the light. Instead, you simply face in the direction of the box.     You are able to do other activities during the session. Ideally, you would work on papers or read something that is in the area being lit up. This will allow the light to be received by your eyes. Your body takes in this information and uses it to regulate the rhythms that control when you sleep and when you wake.   Earlier models of light boxes put out 2,500 to 5,000 lux of light. Lux is a measure of how much light falls on your eyes. These sessions could take two or three hours. Now, many boxes produce 10,000 lux of light.  This allows sessions to take as little as 15 to 30 minutes.     More than one session may be needed each day. It depends upon your body, your need, and the strength of light being used. The key is to use the light at the right time of day and for the right amount of time. This is based upon the sleep disorder you want to correct.     New models are also safer, protecting you from harmful UV rays. Some models are now focusing on a specific bandwidth of light. Light boxes can be purchased in a variety of makes and models. Some are now being made much smaller so they are easier to take with you. General prices range from $200 - $500 per light box.   2. Desk Lamp   This serves the same purpose as a light box, but it is made to look like a normal lamp. It blends in better when used in an office setting.   3. Light Visor   This is a light source that is worn on your head and hangs over your eyes. It looks much like a tennis visor. It is made so that you can move around during sessions. The strength of visor lights also varies from 3,000 to 10,000 lux.   4. Kacey Simulator     These lights gradually make a dark room brighter over a set period of time. This is meant to mimic the sunrise. Some people may find that this helps them wake up in the morning. Models may also slowly dim to copy a sunset.          Who gets it?  Bright light therapy is used for people who suffer from circadian rhythm disorders. The time of day when the light is used will depend upon the disorder it is meant to correct. These disorders include the followin. Delayed sleep phase disorder   This causes people to fall asleep much later at night than is normal. As a result, they also wake up later in the morning. This sleep pattern can interfere with their schedule of activities for the day. To correct delayed sleep phase, light treatment takes place during the early morning hours.   2. Advanced sleep phase disorder   This causes people to fall  asleep much earlier at night than is normal. They also wake up earlier in the morning. To correct it, light treatment takes place early at night.   3. Free-running or Non-24-hour sleep-wake rhythm   People with this disorder fall asleep at a different time each day. For example, you may fall asleep at 10 p.m. one day, Midnight the next day, 2 a.m. the next, etc. This most often occurs in people who are blind. Light therapy may help blind people, even if they can't perceive visible light. Studies show that light treatment may be useful in the early morning hours.   4. Jet lag   Jet lag causes people to have problems with sleep when they have crossed many time zones on a flight. Light therapy in the morning may help when traveling east. For travel to the west, bright light in the evening may help reduce jet lag.   5. Shift Work   This sleep disorder occurs due to a work schedule, such as night shift, that takes place during the time when most people are sleeping. This schedule requires you to work when your body wants to sleep. Then you have to try to sleep when your body expects to be awake. Correcting it can be a hard problem to solve. Changing work schedules, days off, and social activities can alter your exposure to light from day to day. Frequent changes in your sleep times make it hard to re-set your internal clock. In general, using light treatment in the evening should help someone who regularly works nights. In this case, you would also want to avoid daylight when you come off work and go to bed. Dark sunglasses or special goggles can help.   6. Seasonal affective disorder (SAD)   SAD is a mood disorder that can cause people to feel sad and lack energy during the dark months of winter. A similar and milder version is often called the  Winter Blues.  In severe forms, sadness may be caused by depression. Light therapy is thought to be useful as one of the treatments for seasonal mood disorders and depression.  Depression is also treated with medications. You should consult your doctor if you are having serious problems with sadness.         Possible side effects?  Light therapy has a good record of safety. It does not seem to produce any major side effects. Light therapy should always be used within the proper limits for intensity and time. Minor side effects may include the following:   Eye irritation and dryness   Headache   Nausea   Dryness of skin   To reduce these side effects, begin the light therapy very slowly. Give your body time to get used to it. The use of a humidifier can also help with irritations caused by dryness. Talk to your doctor or a sleep specialist before beginning use.       Yes. It is easy to confuse DSP with normal variations of sleep and other types of insomnia. Consulting with a sleep specialist is your best bet to help clarify current sleep problems. He or she will also be able to help you develop a plan to correct these problems. A specialist can assess the factors that cause and make this problem worse. These include both social and behavioral factors.     What will the doctor need to know?   The sleep doctor will do a thorough physical exam. He or she will also discuss with you the history of these sleep problems. It would be helpful to keep a sleep diary prior to seeing a sleep doctor. Bring this information with you to the appointment. A sleep diary is a systematic way to track your sleep pattern. You record the time you get into bed, the time required to fall asleep, and the time you wake up in the morning. Sleep diaries often show a regular pattern of difficulty falling to sleep. They often show few or no awakenings once asleep. They also tend to show a sleep duration that is reduced during the work week and lengthy on the weekend.     Will I need to take any tests?   An overnight sleep study is not normally needed for someone who suffers from DSP. Your doctor may have you do an  overnight sleep study if your problem is severely disturbing your sleep. This study is called a polysomnogram. It charts your brain waves, heart rate, and breathing as you sleep. It also records how your arms and legs move. This study will help determine if there are any objective sleep disorders related to your sleep problem.     How is it treated?   The most accepted treatment for DSP is what is called chronotherapy. This is a method of behavioral treatment. Your bedtime is delayed by about three hours per day for five or six consecutive days. Once the desired bedtime is reached the schedule is frozen. This schedule needs to be maintained rigidly at this point.  Bright light therapy is another proven technique for changing one s internal circadian rhythms. But its specific use for DSP has not been well validated. In theory, exposure to bright light should occur shortly after waking up at the desired time in the morning. Then bright outdoor light in the evening hours should be avoided.               CBT-I Introductory Module    Understanding Sleep and Insomnia    Most people have trouble sleeping at some point in their life.   Chronic insomnia means you have had trouble falling asleep and/or staying asleep for at least the past three months.  Despite allowing enough time for sleep, it is affecting how you feel. You are not alone.  It is estimated that 10-15% of adults experience chronic insomnia.     Cognitive Behavioral Therapy for Insomnia (CBT-I)    Consistent with the guidelines of the American College [employer], [facility] recommends  Cognitive Behavioral Therapy for Insomnia (CBT-I) as the first-line treatment for insomnia.  CBT-I targets factors that lead to long-term insomnia:    Behavioral Conditioning    When you lay awake in bed over many nights, your body actually becomes trained or 'conditioned' to be awake during the night.  It makes the bed associated with alertness instead of  sleepiness.    Habits that weaken your body's sleep drive and circadian sleep rhythm    Changing sleep schedules, extended time in bed, using mobile devices and computers close to bedtime, and naps too late in the day can harm your sleep at night.    Emotional and Physical Arousal    Things such as worrying about sleep, stress, drinking too much caffeine, and not winding down before bed can interfere with your body's natural sleep drive.    Understanding Sleep and Insomnia    [facility] CBT-I is a four-part program that teaches you the skills needed for a better night's sleep. The first step in your program is learning a bit about sleep and insomnia.      The Basics of Sleep    How does sleep help us?    Sleep, like food and water, is something we need every day. The purpose of sleep is still not exactly clear, but sleep experts agree we need consistent quality sleep to function at our best. There is evidence that sleep helps maintain brain and body functions.  It helps maintain thinking ability and mood.  Sleep is actually a very active part of life. Sleep occurs in four stages that cycle every  minutes throughout the night. We get our deepest sleep during the first few hours of sleep.  During the last half of our sleep, we usually get the bulk of our REM (Rapid Eye Movement) sleep.  REM sleep is when most of our dreaming occurs.    How much sleep do we need?    Sleep needs vary from person to person. Most adults need between 6-8 hours of sleep.  Sleeping too little or too much may be a health risk.  As we age, most people report their sleep gets lighter, earlier, shorter, and more restless.     What Controls Our Sleep?    The three things that regulate your sleep are your sleep drive, biological clock and your arousal system (emotional and physical).  Together these make us feel alert during the day and promote sleep at night.    Your sleep drive depends on how long you have been awake. It is lowest  when you first wake up.  Sleep drive gradually increases as the day goes on.  The longer time you are awake the easier it is to fall asleep.  Sleeping gradually reduces your sleep drive. That is why napping in the evening or close to bed can make it harder to sleep at night.    Your biological clock promotes wakefulness during the day and sleep at night.        Understanding Insomnia    What causes insomnia?    Some people have a greater predisposition to developing insomnia due to genetics, personality or age. A host of things can trigger or precipitate it: jet lag, working a different shift, medication, or the onset of a medical or mental health condition.             Insomnia and Your Arousal System    Mental activity, emotions and physical symptoms can make your brain too active to sleep by masking the strength of your sleep drive. Your brain's arousal system not only triggers insomnia, it plays a role in maintaining it as well.  Common sources of arousal include:    Worry about sleep in bed  An active mind concerned about unfinished tasks  Anxiety, Stress and Depression  Pain     What perpetuates insomnia?    Short-term insomnia can become chronic when you begin to feel fearful, worried and  on guard  about sleep loss. As you spend more time in bed or try forcing yourself to sleep your bed becomes linked with wakefulness.  This is why people with insomnia commonly report feeling tired before bed but suddenly more alert when getting into bed.  This type of  conditioning  along with unhelpful sleep habits maintains the insomnia even when the triggering event has resolved.    Tracking your Sleep    Sleep tracking is an essential part of training yourself to sleep better and monitoring your progress.  There are several ways to keep track of your sleep habits.     Insomnia  Mi    The Insomnia  mi is a convenient way to keep track of your sleep prior to and during treatment.  Simply download the free mi  on your Apple or Android phone and record your information each morning.   The data you enter should be your recollection of the past nigh of sleep. Do not watch or monitor the clock in the middle of the night while keeping your sleep diary.     The florin also includes training and sleep schedule recommendations.  We recommend you use only the tracking function unless instructed by your provider. You can email your data to yourself prior to your visit by using the [employer] User Data function found in the Settings Section.  It is important that you have your data available to review with your provider at the time of your visit.             [employer] Sleep Diary    You can also track your sleep using the [employer] paper sleep diary.  You can upload your sleep diary and send it via a [employer] message, fax it to [phone], or have it with you at the time of your visit.          Mobile and Wearable Sleep Tracking Devices    There are many sleep tracking devices and mobile applications that estimate the timing and quantity of sleep by measuring body movement.  Though many of these devices claim to measure the depth or stages of sleep, they cannot measure brain wave activity or other indicators required to determine the actual stages of sleep.  They are helpful in estimating the timing and total amount of time you sleep.    CBT-I and Sleeping Pills    Sleep medications can be helpful in the short-term but often stop working in the long-term.  Sleeping pills treat symptoms and not the underlying cause of insomnia.  They also can have side effects that last well into the day. Abruptly stopping sleeping pills can cause temporary rebound insomnia and lead to increased distress about sleeplessness.  This in turn strengthens the belief that pills are necessary for sleep.    Many patients choose to discontinue sleeping pills prior to beginning CBT-I.  You should talk to your prescribing provider before tapering or  discontinuing sleep medication.

## 2025-03-03 NOTE — PROGRESS NOTES
[facility] [address] [address]  Phone: [phone]    Patient:  Ellis Hull, Date of birth 2000  Date of Visit:  03/03/2025  Referring Provider Vilma Lester                  Virtual Visit Details    Type of service:  Video Visit     Originating Location (pt. Location): Home    Distant Location (provider location):  On-site  Platform used for Video Visit: Marisela         Assessment and Plan:   24 years old transgender male with history of generalized anxiety disorder, major depression, posttraumatic stress disorder, autism spectrum, exercise-induced bronchial asthma, hypothyroidism, history of iron deficiency anemia, Osgood Edmondson's disease and class III obesity has struggled with poor sleep for several years.  Nay described himself historically as a night with sleep between 1 AM to 2 AM and would wake up around 10 AM to 11 AM.  Over years, with work and social responsibilities, Nay has to wake up with an alarm to make it to work and take care of other responsibilities.  This has led to irregularity in sleep and wake time, difficulty falling asleep and an overall restrictive sleep apnea. Nay at around age 7 underwent a tonsillectomy and adenoidectomy for presumed obstructive sleep apnea due to his snoring and witnessed apneic episodes.  He does continue to snore but is not sure if he is stop breathing during sleep.  He also reports of having discomfort, pain or irritability affecting his legs which is usually worse at bedtime.  Circadian Rhythm - Delayed Sleep Phase Type/Disorder. Nay's clinical history is quite suggestive of delayed sleep phase type with associated disorder which is likely the explanation for irregularity and inconsistency bedtime and associated sleep initiation insomnia.  More recently, he is able to fall asleep between 10 PM and midnight but due to significant variability of sleep and wake time during the week does  struggle with 2 to 3 hours of social jet lag particularly at the beginning of the week.  Inadequate sleep hygiene.  Inconsistent bed and wake time which can vary over 3 hours.  He spent most of the time laying in bed and attempt to sleep leading to poor sleep efficiency.  Insufficient sleep.  During the workweek although he attempts to sleep up to 9 hours, on bad nights he would be laying in bed up until 2 AM and may have to leave the bed between 5 AM to 7 AM with an alarm.  This on occasion can lead to significant sleep restriction can contribute to it is symptoms of daytime fatigue and tiredness.  Sleep disordered breathing.  History and clinical presentation is quite suggestive with a high pretest probability for obstructive sleep apnea.  Nay has severe class III obesity although symptoms are not suggestive of obesity related hypoventilation.  In the absence of any significant cardio vascular or pulmonary comorbidities, a type III home sleep test is a reasonable screening tool to determine the presence and severity of obstructive sleep apnea.  Restless leg syndrome.  He does have history suggestive of restless legs.  In past the symptoms have improved with use of magnesium supplement.  Historically he has had iron deficiency anemia which likely is contributing to was his more recent symptoms.      Comorbid Diagnoses:    Major depression.  Generalized anxiety disorder.  Posttraumatic stress disorder.  Gastric spectrum.  Bronchial asthma-exercise-induced.  Hypothyroidism.  Osgood-Schlatter's disease.  Class III obesity BMI 45.14    Summary Recommendations:    Sleep hygiene was discussed with focus on maintaining a consistent bed and wake time.  He is advised not to go to bed earlier than 11 PM and consistently wake at around 7 AM during the workweek and around 8 AM over the weekend.  Stimulus control was discussed in detail and advised along with constricting the overall bedtime to improve sleep efficiency.  BENITEZ  formal consultation with sleep behavior specialist for cognitive behavioral therapy for insomnia and comanagement of circadian rhythm misalignment has been requested.  NOx T3 home sleep test to screen for and determine the severity of obstructive sleep apnea present.  Oral Ferrous sulfate 325 mg to be taken twice daily with meals along with ascorbic acid.    Summary Counseling:  Check out [email or web address]           History of Present Illness:     Ellis Hull is a 24 year old adult with above-mentioned medical history has struggled with poor sleep for several years.  Nay historically has been a night owl sleep around 2 AM and would wake up around 11 AM.  Over years with work and other social responsibilities, he has been trying to sleep around 10 PM with some success.  Usually goes to bed around 10 PM and it can take him between 15 minutes up to 3 hours to fall asleep.  At least once in a week he will struggle with laying in his bed until 1 AM to 2 AM.  He wakes up with an alarm around 7 AM Monday through Thursday.  On Friday, he has an early shift and has to wake up at 5 AM.  He has been told of his loud snoring and has a child underwent tonsillectomy and adenoidectomy at age 7 for snoring and witnessed apneic episodes.  He does not wake up well rested.  Denies symptoms of hypnagogic, hypnopompic hallucinations, sleep paralysis, orthopnea or paroxysmal nocturnal dyspnea.  He does report of having discomfort in his leg or sense of irritability which bothers him at night.  In the past he used to interfere with his ability to fall asleep.  The symptoms were helped with taking over-the-counter magnesium  supplements.  The symptoms have recurred over the past month and are interfering with his ability to sleep.  During the daytime, though he is not reporting of much sleepiness he always feels tired and fatigued.      Sleep wake schedule:   Described themself as a night owl sleeping at 2 AM, wake at 10  "- 11 AM  Workday bedtime 10 PM, fall sleep between 10:15 PM - 2 AM  Awakening 7 AM with alarm Mondays - Thursdays, 5 AM  using alarm on Fridays.  Nonworkday bedtime 12 AM - 12: 30 AM,  Awakening 7 AM - 10:30 AM   Awakenings 2-3 for 1-5 minutes for uncertain reasons/week  Naps: 0-1 per week 1-2 hours usually over the weekend.     He does not feel rested in the morning.    [place] Sleepiness Scale: 9/24    LAMIN Total Score: (Patient-Rptd) 15    STOP-BANG:    PHQ-9: 16  Managed by psychologist weekly.         Medications:     Current Outpatient Medications   Medication Sig Dispense Refill    albuterol (ACCUNEB) 1.25 MG/3ML neb solution Inhale 1.25 mg into the lungs      B-D HYPODERMIC NEEDLE 23G X 1\" MISC USE TO INJECT TESTOSTERONE CYPIONATE INTO THE MUSCLE ONCE WEEKLY      B-D SYRINGE LUER-IVAN 1 ML MISC USE WITH WEEKLY INJECTIONS OF TESTOSTERONE CYPIONATE      BD DISP NEEDLES 20G X 1\" MISC USE TO DRAW UP WEEKLY DOSE OF TESTOSTERONE CYPIONATE      cholecalciferol (VITAMIN D3) 125 mcg (5000 units) capsule Take by mouth daily.      levothyroxine (SYNTHROID/LEVOTHROID) 50 MCG tablet Take 50 mcg by mouth      minoxidil (LONITEN) 2.5 MG tablet Take 1 tablet (2.5 mg) by mouth daily. 90 tablet 3    ondansetron (ZOFRAN ODT) 4 MG ODT tab Take 1 tablet (4 mg) by mouth every 6 hours as needed for nausea or vomiting. 15 tablet 0    propranolol (INDERAL) 40 MG tablet Take 1 tablet (40 mg) by mouth 2 times daily. 180 tablet 1    SUMAtriptan (IMITREX) 25 MG tablet Take 1 tablet (25 mg) by mouth at onset of headache for migraine. May repeat in 2 hours. Max 8 tablets/24 hours. 90 tablet 0    testosterone cypionate (DEPOTESTOSTERONE) 200 MG/ML injection Inject 0.2 mLs (40 mg) into the muscle once a week. 4 mL 4    venlafaxine (EFFEXOR XR) 75 MG 24 hr capsule Take 75 mg by mouth daily.      VYVANSE 70 MG capsule Take 70 mg by mouth every morning.       No current facility-administered medications for this visit.        Allergies "   Allergen Reactions    Adhesive Tape Itching and Rash     To surgical tape / Exofin    Amoxicillin-Pot Clavulanate Diarrhea    Azithromycin Rash    Cefdinir Rash    Cefprozil Nausea and GI Disturbance            Past Medical History:     Does not need 02 supplement at night   Past Medical History:   Diagnosis Date    Epistaxis, hereditary     Uncomplicated asthma              Past Surgical History:    No h/o  upper airway surgery  Past Surgical History:   Procedure Laterality Date    TRANSGENDER MASTECTOMY Bilateral 2/16/2021    Procedure: Bilateral mastectomy;  Surgeon: Christie Hines MD;  [place] [facility] [place]            Physical Examination:     Vitals:  No vitals were obtained today due to virtual visit.    Physical Exam   HEENT: Eyes grossly normal to inspection.  No discharge or erythema, or obvious scleral/conjunctival abnormalities.  Mallampati score 3  SKIN: Visible skin clear. No significant rash, abnormal pigmentation or lesions.  NEURO: Cranial nerves grossly intact.  Mentation and speech appropriate for age.  GENERAL: Healthy, alert and no distress  RESP: No audible wheeze, cough, or visible cyanosis.  No visible retractions or increased work of breathing.    PSYCH: Mentation appears normal, affect normal/bright, judgement and insight intact, normal speech and appearance well-groomed.     Latest Reference Range & Units 01/20/25 08:07   Iron 37 - 157 ug/dL 61   Iron Binding Capacity 240 - 430 ug/dL 313   Iron Sat Index 15 - 46 % 19      Latest Reference Range & Units 01/20/25 08:07   Ferritin 6 - 409 ng/mL 103       Copy to: Nohemi Aguilar    Total of 60 minutes of time was spent with patient, this included the interview and exam, and review of the chart/labs/imaging/sleep study/PAP therapy data on 03/03/2025. Greater than 50% of which was spent counseling and coordinating care.   Lissette Morales MD 3/3/2025     [facility] [place] [facility]   [address], [address]    [address] [place] [address]   Appointments: [phone]

## 2025-03-03 NOTE — NURSING NOTE
Current patient location:  work    Is the patient currently in the state of [place]? YES    Visit mode: VIDEO    If the visit is dropped, the patient can be reconnected by:VIDEO VISIT: Text to cell phone:   Telephone Information:   Mobile [phone]       Will anyone else be joining the visit? NO  (If patient encounters technical issues they should call [phone] :[id number])    Are changes needed to the allergy or medication list? No    Are refills needed on medications prescribed by this physician? NO    Rooming Documentation:  Not applicable    Reason for visit: Consult    Vanda Stovall VVF    Depression Response    PHQ9=16    Patient completed the PHQ-9 assessment for depression and scored >9? Yes  Question 9 on the PHQ-9 was positive for suicidality? Yes  Does patient have current mental health provider? Yes    Is this a virtual visit? Yes   Does patient have suicidal ideation (positive question 9)? No - offer to place Mental Health Referral.  Patient declined referral/not needed    I personally notified the following: visit provider

## 2025-03-24 ENCOUNTER — OFFICE VISIT (OUTPATIENT)
Dept: FAMILY MEDICINE | Facility: CLINIC | Age: 25
End: 2025-03-24
Payer: COMMERCIAL

## 2025-03-24 VITALS
OXYGEN SATURATION: 99 % | WEIGHT: 258.9 LBS | HEIGHT: 65 IN | SYSTOLIC BLOOD PRESSURE: 123 MMHG | HEART RATE: 122 BPM | TEMPERATURE: 97.6 F | RESPIRATION RATE: 24 BRPM | DIASTOLIC BLOOD PRESSURE: 84 MMHG | BODY MASS INDEX: 43.13 KG/M2

## 2025-03-24 DIAGNOSIS — R63.4 WEIGHT LOSS: ICD-10-CM

## 2025-03-24 DIAGNOSIS — G89.29 CHRONIC BILATERAL LOW BACK PAIN, UNSPECIFIED WHETHER SCIATICA PRESENT: ICD-10-CM

## 2025-03-24 DIAGNOSIS — G43.109 MIGRAINE WITH AURA AND WITHOUT STATUS MIGRAINOSUS, NOT INTRACTABLE: ICD-10-CM

## 2025-03-24 DIAGNOSIS — R00.0 TACHYCARDIA: ICD-10-CM

## 2025-03-24 DIAGNOSIS — R53.82 CHRONIC FATIGUE: Primary | ICD-10-CM

## 2025-03-24 DIAGNOSIS — R42 POSTURAL DIZZINESS WITH PRESYNCOPE: ICD-10-CM

## 2025-03-24 DIAGNOSIS — M47.816 FACET ARTHROPATHY, LUMBAR: ICD-10-CM

## 2025-03-24 DIAGNOSIS — Z78.9 IMPAIRED MOBILITY AND ADLS: ICD-10-CM

## 2025-03-24 DIAGNOSIS — R55 POSTURAL DIZZINESS WITH PRESYNCOPE: ICD-10-CM

## 2025-03-24 DIAGNOSIS — M54.50 CHRONIC BILATERAL LOW BACK PAIN, UNSPECIFIED WHETHER SCIATICA PRESENT: ICD-10-CM

## 2025-03-24 DIAGNOSIS — Z74.09 IMPAIRED MOBILITY AND ADLS: ICD-10-CM

## 2025-03-24 PROCEDURE — G2211 COMPLEX E/M VISIT ADD ON: HCPCS | Performed by: STUDENT IN AN ORGANIZED HEALTH CARE EDUCATION/TRAINING PROGRAM

## 2025-03-24 PROCEDURE — 99214 OFFICE O/P EST MOD 30 MIN: CPT | Performed by: STUDENT IN AN ORGANIZED HEALTH CARE EDUCATION/TRAINING PROGRAM

## 2025-03-24 NOTE — PROGRESS NOTES
"  Assessment & Plan     Chronic fatigue  Migraine with aura and without status migrainosus, not intractable  Postural dizziness with presyncope  Impaired mobility and ADLs  Facet arthropathy, lumbar  Chronic bilateral low back pain, unspecified whether sciatica present  Tachycardia  Chronic conditions are suboptimally controlled and causing debilitation affecting ability to work. Completed FMLA forms with the patient. Also completed DME documentation for mobility device.   - will continue to explore further treatment options radha for tachycardia/orthostatic intolerance    Weight loss  Seems directly r/t inability to adequately feed self when symptoms poorly controlled. Main goal is for work accommodations to allow pt to conserve energy and take in nutrition. If worsening despite adequate PO intake, will do further workup. Otherwise may need to escalate accommodations if fatigue remains too great to maintain appropriate intake.       The longitudinal plan of care for the diagnosis(es)/condition(s) as documented were addressed during this visit. Due to the added complexity in care, I will continue to support [place] in the subsequent management and with ongoing continuity of care.     BMI  Estimated body mass index is 43.35 kg/m  as calculated from the following:    Height as of this encounter: 1.646 m (5' 4.8\").    Weight as of this encounter: 117.4 kg (258 lb 14.4 oz).       Depression Screening Follow Up        3/24/2025    11:48 AM   PHQ   PHQ-9 Total Score 16    Q9: Thoughts of better off dead/self-harm past 2 weeks Several days   F/U: Thoughts of suicide or self-harm No   F/U: Safety concerns No       Patient-reported       Follow Up Actions Taken  Referred patient back to mental health provider     Return in about 2 months (around 5/24/2025) for Follow up, with me.    Chanelle Corral is a 24 year old, presenting for the following health issues:  RECHECK and Forms    HPI      Sleep med visit: home sleep " "study and oral iron. Sleep BH. Did not have a good connection with the provider. Was recommended a home sleep study.     - is unsure that theyd be able to tolerate a CPAP - tosses and turns, sleeps on stomach   - was sent oral iron, doesn't tolerate, caused nausea in the past and vomited   - RLS is worse in the winter time. Magnesium helps. Inclined not to try iron.         FMLA: if needs a day off for recovery, kicks in automatically and can use PTO but will also pull from FMLA hours so if ther'es a week that's really bad or does get sick and ends in the hospital, is covered   - left early with migraine a week ago that came on super fast     Got a SMRT form, friends helping them fill it out. One friend said you'll probably get denied the first couple of the times because they work     Has noticed that they've lost 11 lbs since Dec/Tre -- hasn't been able to feed themselves. Symptoms have made it too hard to eat, prep food. Has to pick between other tasks or eat. Eating is exhausting. Mostly on work nights. Has started sleeping on their futon in the living room.        Past workup: CRP 6, ow labs reassuring.            Objective    /84   Pulse (!) 122   Temp 97.6  F (36.4  C) (Temporal)   Resp 24   Ht 1.646 m (5' 4.8\")   Wt 117.4 kg (258 lb 14.4 oz)   SpO2 99%   BMI 43.35 kg/m    Body mass index is 43.35 kg/m .  Physical Exam   GENERAL: alert, cooperative, in no acute distress. Appears tired.   HEENT: sclera clear  PULM: normal respiratory effort   NEURO: alert and oriented, grossly intact, moves all extremities   SKIN: no rashes or lesions visualized   PSYCH: restricted, tired affect             Signed Electronically by: Nohemi Aguilar DO    "

## 2025-04-03 ENCOUNTER — DOCUMENTATION ONLY (OUTPATIENT)
Dept: FAMILY MEDICINE | Facility: CLINIC | Age: 25
End: 2025-04-03
Payer: COMMERCIAL

## 2025-04-03 NOTE — PROGRESS NOTES
"When opening a documentation only encounter, be sure to enter in \"Chief Complaint\" Forms and in \" Comments\" Title of form, description if needed.    Ellis is a 25 year old  adult  Form received via: Fax  Form now resides in: Provider Ready    Deepali Preciado, CAMILLA               "

## 2025-04-09 ENCOUNTER — MEDICAL CORRESPONDENCE (OUTPATIENT)
Dept: HEALTH INFORMATION MANAGEMENT | Facility: CLINIC | Age: 25
End: 2025-04-09
Payer: COMMERCIAL

## 2025-05-26 ENCOUNTER — MYC MEDICAL ADVICE (OUTPATIENT)
Dept: FAMILY MEDICINE | Facility: CLINIC | Age: 25
End: 2025-05-26
Payer: COMMERCIAL

## 2025-05-26 DIAGNOSIS — E03.9 HYPOTHYROIDISM, UNSPECIFIED TYPE: Primary | ICD-10-CM

## 2025-05-27 ENCOUNTER — DOCUMENTATION ONLY (OUTPATIENT)
Dept: FAMILY MEDICINE | Facility: CLINIC | Age: 25
End: 2025-05-27
Payer: COMMERCIAL

## 2025-05-27 RX ORDER — LEVOTHYROXINE SODIUM 50 UG/1
50 TABLET ORAL
Qty: 90 TABLET | Refills: 3 | Status: SHIPPED | OUTPATIENT
Start: 2025-05-27

## 2025-05-30 PROBLEM — R53.82 CHRONIC FATIGUE: Status: ACTIVE | Noted: 2025-05-30

## 2025-06-29 ENCOUNTER — HOSPITAL ENCOUNTER (EMERGENCY)
Facility: HOSPITAL | Age: 25
Discharge: HOME | End: 2025-06-30
Attending: EMERGENCY MEDICINE | Admitting: EMERGENCY MEDICINE
Payer: COMMERCIAL

## 2025-06-29 DIAGNOSIS — G43.909 MIGRAINE WITHOUT STATUS MIGRAINOSUS, NOT INTRACTABLE, UNSPECIFIED MIGRAINE TYPE: ICD-10-CM

## 2025-06-29 PROCEDURE — 99284 EMERGENCY DEPT VISIT MOD MDM: CPT | Mod: 25

## 2025-06-29 RX ORDER — METOCLOPRAMIDE HYDROCHLORIDE 5 MG/ML
5 INJECTION INTRAMUSCULAR; INTRAVENOUS ONCE
Status: COMPLETED | OUTPATIENT
Start: 2025-06-30 | End: 2025-06-30

## 2025-06-29 RX ORDER — DIPHENHYDRAMINE HYDROCHLORIDE 50 MG/ML
25 INJECTION, SOLUTION INTRAMUSCULAR; INTRAVENOUS ONCE
Status: COMPLETED | OUTPATIENT
Start: 2025-06-30 | End: 2025-06-30

## 2025-06-29 RX ORDER — KETOROLAC TROMETHAMINE 15 MG/ML
15 INJECTION, SOLUTION INTRAMUSCULAR; INTRAVENOUS ONCE
Status: COMPLETED | OUTPATIENT
Start: 2025-06-30 | End: 2025-06-30

## 2025-06-29 ASSESSMENT — COLUMBIA-SUICIDE SEVERITY RATING SCALE - C-SSRS
4. HAVE YOU HAD THESE THOUGHTS AND HAD SOME INTENTION OF ACTING ON THEM?: NO
3. HAVE YOU BEEN THINKING ABOUT HOW YOU MIGHT KILL YOURSELF?: NO
2. HAVE YOU ACTUALLY HAD ANY THOUGHTS OF KILLING YOURSELF IN THE PAST MONTH?: YES
5. HAVE YOU STARTED TO WORK OUT OR WORKED OUT THE DETAILS OF HOW TO KILL YOURSELF? DO YOU INTEND TO CARRY OUT THIS PLAN?: NO
6. HAVE YOU EVER DONE ANYTHING, STARTED TO DO ANYTHING, OR PREPARED TO DO ANYTHING TO END YOUR LIFE?: NO
1. IN THE PAST MONTH, HAVE YOU WISHED YOU WERE DEAD OR WISHED YOU COULD GO TO SLEEP AND NOT WAKE UP?: YES

## 2025-06-30 VITALS
HEART RATE: 64 BPM | TEMPERATURE: 98.3 F | OXYGEN SATURATION: 98 % | SYSTOLIC BLOOD PRESSURE: 115 MMHG | DIASTOLIC BLOOD PRESSURE: 71 MMHG | RESPIRATION RATE: 20 BRPM

## 2025-06-30 LAB
ANION GAP SERPL CALCULATED.3IONS-SCNC: 11 MMOL/L (ref 7–15)
BASOPHILS # BLD AUTO: 0 10E3/UL (ref 0–0.2)
BASOPHILS NFR BLD AUTO: 0 %
BUN SERPL-MCNC: 6.5 MG/DL (ref 6–20)
CALCIUM SERPL-MCNC: 9.4 MG/DL (ref 8.8–10.4)
CHLORIDE SERPL-SCNC: 102 MMOL/L (ref 98–107)
CREAT SERPL-MCNC: 0.67 MG/DL (ref 0.51–1.17)
EGFRCR SERPLBLD CKD-EPI 2021: >90 ML/MIN/1.73M2
EOSINOPHIL # BLD AUTO: 0.2 10E3/UL (ref 0–0.7)
EOSINOPHIL NFR BLD AUTO: 2 %
ERYTHROCYTE [DISTWIDTH] IN BLOOD BY AUTOMATED COUNT: 13.2 % (ref 10–15)
GLUCOSE SERPL-MCNC: 107 MG/DL (ref 70–99)
HCO3 SERPL-SCNC: 27 MMOL/L (ref 22–29)
HCT VFR BLD AUTO: 44.3 % (ref 35–53)
HGB BLD-MCNC: 14.2 G/DL (ref 11.7–17.7)
IMM GRANULOCYTES # BLD: 0.1 10E3/UL
IMM GRANULOCYTES NFR BLD: 1 %
LYMPHOCYTES # BLD AUTO: 2 10E3/UL (ref 0.8–5.3)
LYMPHOCYTES NFR BLD AUTO: 18 %
MCH RBC QN AUTO: 26.9 PG (ref 26.5–33)
MCHC RBC AUTO-ENTMCNC: 32.1 G/DL (ref 31.5–36.5)
MCV RBC AUTO: 84 FL (ref 78–100)
MONOCYTES # BLD AUTO: 0.7 10E3/UL (ref 0–1.3)
MONOCYTES NFR BLD AUTO: 7 %
NEUTROPHILS # BLD AUTO: 8.1 10E3/UL (ref 1.6–8.3)
NEUTROPHILS NFR BLD AUTO: 73 %
NRBC # BLD AUTO: 0 10E3/UL
NRBC BLD AUTO-RTO: 0 /100
PLATELET # BLD AUTO: 324 10E3/UL (ref 150–450)
POTASSIUM SERPL-SCNC: 3.8 MMOL/L (ref 3.4–5.3)
RBC # BLD AUTO: 5.27 10E6/UL (ref 3.8–5.9)
SODIUM SERPL-SCNC: 140 MMOL/L (ref 135–145)
WBC # BLD AUTO: 11.1 10E3/UL (ref 4–11)

## 2025-06-30 PROCEDURE — 96375 TX/PRO/DX INJ NEW DRUG ADDON: CPT

## 2025-06-30 PROCEDURE — 36415 COLL VENOUS BLD VENIPUNCTURE: CPT | Performed by: EMERGENCY MEDICINE

## 2025-06-30 PROCEDURE — 250N000011 HC RX IP 250 OP 636: Performed by: EMERGENCY MEDICINE

## 2025-06-30 PROCEDURE — 96374 THER/PROPH/DIAG INJ IV PUSH: CPT

## 2025-06-30 PROCEDURE — 258N000003 HC RX IP 258 OP 636: Performed by: EMERGENCY MEDICINE

## 2025-06-30 PROCEDURE — 80048 BASIC METABOLIC PNL TOTAL CA: CPT | Performed by: EMERGENCY MEDICINE

## 2025-06-30 PROCEDURE — 85025 COMPLETE CBC W/AUTO DIFF WBC: CPT | Performed by: EMERGENCY MEDICINE

## 2025-06-30 PROCEDURE — 96361 HYDRATE IV INFUSION ADD-ON: CPT

## 2025-06-30 RX ADMIN — METOCLOPRAMIDE 5 MG: 5 INJECTION, SOLUTION INTRAMUSCULAR; INTRAVENOUS at 00:15

## 2025-06-30 RX ADMIN — KETOROLAC TROMETHAMINE 15 MG: 15 INJECTION, SOLUTION INTRAMUSCULAR; INTRAVENOUS at 00:15

## 2025-06-30 RX ADMIN — SODIUM CHLORIDE 1000 ML: 0.9 INJECTION, SOLUTION INTRAVENOUS at 00:24

## 2025-06-30 RX ADMIN — DIPHENHYDRAMINE HYDROCHLORIDE 25 MG: 50 INJECTION, SOLUTION INTRAMUSCULAR; INTRAVENOUS at 00:15

## 2025-06-30 ASSESSMENT — ACTIVITIES OF DAILY LIVING (ADL)
ADLS_ACUITY_SCORE: 61
ADLS_ACUITY_SCORE: 61

## 2025-06-30 NOTE — ED PROVIDER NOTES
NAME: Ellis Hull  AGE: 25 year old adult  YOB: 2000  MRN: [id number]  EVALUATION DATE & TIME: No admission date for patient encounter.    PCP: Nohemi Aguilar    ED PROVIDER: Jeffry Minaya M.D.      Chief Complaint   Patient presents with    Nausea    Headache         FINAL IMPRESSION:  1. Migraine without status migrainosus, not intractable, unspecified migraine type        MEDICAL DECISION MAKIN:29 PM I met with the patient, obtained history, performed an initial exam, and discussed options and plan for diagnostics and treatment here in the ED.   1:21 AM Patient's headache is feeling improved. Patient denies any suicidal or harmful ideations, but says harmful thoughts occur sometimes because of depression.  Patient was clinically assessed and consented to treatment. After assessment, medical decision making and workup were discussed with the patient. The patient was agreeable to plan for testing, workup, and treatment.  Pertinent Labs & Imaging studies reviewed. (See chart for details)       Medical Decision Making  I reviewed the EMR: Outpatient Record: Record review for migraine headaches as well as mental health past reviewed.  Care impacted by Mental Health  Discharge. No recommendations on prescription strength medication(s). See documentation for any additional details.    MIPS (CTPE, Dental pain, Ferrer, Sinusitis, Asthma/COPD, Head Trauma): Not Applicable    SEPSIS: None    Ellis Hull is a 25 year old adult who presents with nausea and headache.   Differential diagnosis includes but not limited to migraine headache, depression, chronic suicidal thoughts, nausea.  Patient 25-year-old male with history of migraine headaches.  Patient with migraine headache and similar to past.  Patient with no red flags or concerning the headache which is similar to prior.  Patient does have photophobia nausea and after discussion patient was brought back to room and IV was placed  given patient migraine cocktail.  Patient had IV fluids as well and complete resolution of symptoms.  Patient I did discuss his suicidal screening which he reports he has chronic suicidal thoughts but presently has not had any thoughts about harming himself or any active plan.  He does have continued ongoing depression but feels safe going home and does not wish to be admitted for mental health or think he needs to be assessed for mental health concerns.  Patient will plan for discharge home continue regular medications at home and follow-up with primary care as needed.    0 minutes of critical care time    MEDICATIONS GIVEN IN THE EMERGENCY:  Medications   metoclopramide (REGLAN) injection 5 mg (5 mg Intravenous $Given 6/30/25 0015)   diphenhydrAMINE (BENADRYL) injection 25 mg (25 mg Intravenous $Given 6/30/25 0015)   ketorolac (TORADOL) injection 15 mg (15 mg Intravenous $Given 6/30/25 0015)   sodium chloride 0.9% BOLUS 1,000 mL (0 mLs Intravenous Stopped 6/30/25 0225)       NEW PRESCRIPTIONS STARTED AT TODAY'S ER VISIT:  Discharge Medication List as of 6/30/2025  2:25 AM             =================================================================    HPI    Patient information was obtained from: the patient    Use of : N/A     Ellis Hull is a 25 year old adult with a past medical history of hypothyroidism, prediabetes, who presents via ambulance for evaluation of nausea, headache.    Patient states they had a migraine earlier while helping a friend move at 2200, at this time patient rated pain  10/10. Patient said they took 2 Excedrin at pain is now 3/10. Patient reports no reent head injuries. Patient states symptoms felt similar to past migraines.       REVIEW OF SYSTEMS   Review of Systems     PAST MEDICAL HISTORY:  Past Medical History:   Diagnosis Date    Epistaxis, hereditary     Uncomplicated asthma        PAST SURGICAL HISTORY:  Past Surgical History:   Procedure Laterality Date     "TRANSGENDER MASTECTOMY Bilateral 2/16/2021    Procedure: Bilateral mastectomy;  Surgeon: Christie Hines MD;  Location: [facility] OR       CURRENT MEDICATIONS:    No current facility-administered medications for this encounter.    Current Outpatient Medications:     albuterol (ACCUNEB) 1.25 MG/3ML neb solution, Inhale 1.25 mg into the lungs, Disp: , Rfl:     atenolol (TENORMIN) 50 MG tablet, Take 1 tablet (50 mg) by mouth daily., Disp: 30 tablet, Rfl: 11    B-D HYPODERMIC NEEDLE 23G X 1\" MISC, USE TO INJECT TESTOSTERONE CYPIONATE INTO THE MUSCLE ONCE WEEKLY, Disp: , Rfl:     B-D SYRINGE LUER-IVAN 1 ML MISC, USE WITH WEEKLY INJECTIONS OF TESTOSTERONE CYPIONATE, Disp: , Rfl:     BD DISP NEEDLES 20G X 1\" MISC, USE TO DRAW UP WEEKLY DOSE OF TESTOSTERONE CYPIONATE, Disp: , Rfl:     budesonide-formoterol (SYMBICORT/BREYNA) 160-4.5 MCG/ACT inhaler, Inhale 2 puffs into the lungs 2 times daily., Disp: 10.2 g, Rfl: 11    cholecalciferol (VITAMIN D3) 125 mcg (5000 units) capsule, Take by mouth daily., Disp: , Rfl:     finasteride (PROPECIA) 1 MG tablet, Take 1 tablet (1 mg) by mouth daily., Disp: , Rfl:     levothyroxine (SYNTHROID/LEVOTHROID) 50 MCG tablet, Take 1 tablet (50 mcg) by mouth every morning (before breakfast)., Disp: 90 tablet, Rfl: 3    SUMAtriptan (IMITREX) 25 MG tablet, Take 1 tablet (25 mg) by mouth at onset of headache for migraine. May repeat in 2 hours. Max 8 tablets/24 hours., Disp: 90 tablet, Rfl: 0    testosterone cypionate (DEPOTESTOSTERONE) 200 MG/ML injection, INJECT 0.2 MLS (40 MG) INTO THE MUSCLE ONCE A WEEK., Disp: 4 mL, Rfl: 3    venlafaxine (EFFEXOR XR) 75 MG 24 hr capsule, Take 75 mg by mouth daily., Disp: , Rfl:     VYVANSE 70 MG capsule, Take 70 mg by mouth every morning., Disp: , Rfl:     ALLERGIES:  Allergies   Allergen Reactions    Adhesive Tape Itching and Rash     To surgical tape / Exofin    Amoxicillin-Pot Clavulanate Diarrhea    Azithromycin Rash    Cefdinir Rash    Cefprozil Nausea and GI " Disturbance       FAMILY HISTORY:  Family History   Problem Relation Age of Onset    Diabetes Type 2  Mother     Other Problems  (stoke) Maternal Grandmother     Lung Cancer Maternal Grandmother     Heart Disease Maternal Grandfather     Diabetes Type 1 Maternal Grandfather        SOCIAL HISTORY:   Social History     Socioeconomic History    Marital status: Single   Tobacco Use    Smoking status: Never    Smokeless tobacco: Never   Substance and Sexual Activity    Alcohol use: Yes     Comment: 1-2 drinks per year    Drug use: Yes     Types: Marijuana, LSD     Social Drivers of Health     Financial Resource Strain: Low Risk  (1/12/2025)    Financial Resource Strain     Within the past 12 months, have you or your family members you live with been unable to get utilities (heat, electricity) when it was really needed?: No   Food Insecurity: Low Risk  (1/12/2025)    Food Insecurity     Within the past 12 months, did you worry that your food would run out before you got money to buy more?: No     Within the past 12 months, did the food you bought just not last and you didn t have money to get more?: No   Transportation Needs: Low Risk  (1/12/2025)    Transportation Needs     Within the past 12 months, has lack of transportation kept you from medical appointments, getting your medicines, non-medical meetings or appointments, work, or from getting things that you need?: No   Physical Activity: Insufficiently Active (11/6/2023)    Received from [employer] [facility]    Exercise Vital Sign     Days of Exercise per Week: 2 days     Minutes of Exercise per Session: 20 min   Stress: Stress Concern Present (11/6/2023)    Received from [employer] [facility] [employer] [facility]    [employer] [place] [employer] - Occupational Stress Questionnaire     Feeling of Stress : Very much   Social Connections: Socially Isolated (11/6/2023)    Received from [facility] [facility] [employer] [facility]    Social Connection and Isolation Panel  [NHANES]     Frequency of Communication with Friends and Family: Once a week     Frequency of Social Gatherings with Friends and Family: Never     Attends Congregational Services: Never     Active Member of Clubs or Organizations: Yes     Attends Club or Organization Meetings: More than 4 times per year     Marital Status: Never    Interpersonal Safety: Low Risk  (5/15/2025)    Interpersonal Safety     Do you feel physically and emotionally safe where you currently live?: Yes     Within the past 12 months, have you been hit, slapped, kicked or otherwise physically hurt by someone?: No     Within the past 12 months, have you been humiliated or emotionally abused in other ways by your partner or ex-partner?: No   Housing Stability: High Risk (1/12/2025)    Housing Stability     Do you have housing? : Yes     Are you worried about losing your housing?: Yes       PHYSICAL EXAM:    Vitals: /71   Pulse 64   Temp 98.3  F (36.8  C) (Oral)   Resp 20   LMP 06/23/2025 (Approximate)   SpO2 98%    Physical Exam  Vitals and nursing note reviewed.   Constitutional:       General: He is not in acute distress.     Appearance: Normal appearance. He is obese. He is not ill-appearing or toxic-appearing.   HENT:      Head: Normocephalic and atraumatic.   Eyes:      Extraocular Movements: Extraocular movements intact.      Pupils: Pupils are equal, round, and reactive to light.   Cardiovascular:      Rate and Rhythm: Normal rate and regular rhythm.      Heart sounds: Normal heart sounds.   Pulmonary:      Effort: Pulmonary effort is normal. No respiratory distress.      Breath sounds: Normal breath sounds.   Musculoskeletal:      Cervical back: Normal range of motion.   Skin:     General: Skin is warm and dry.   Neurological:      General: No focal deficit present.      Mental Status: He is alert.      Cranial Nerves: No cranial nerve deficit, dysarthria or facial asymmetry.      Motor: No seizure activity.   Psychiatric:          Mood and Affect: Mood is depressed. Affect is flat.         Speech: Speech normal.         Behavior: Behavior is cooperative.         Thought Content: Thought content includes suicidal ideation. Thought content does not include homicidal ideation. Thought content does not include suicidal plan.         Cognition and Memory: Cognition is not impaired.           LAB:  All pertinent labs reviewed and interpreted.  Labs Ordered and Resulted from Time of ED Arrival to Time of ED Departure   BASIC METABOLIC PANEL - Abnormal       Result Value    Sodium 140      Potassium 3.8      Chloride 102      Carbon Dioxide (CO2) 27      Anion Gap 11      Urea Nitrogen 6.5      Creatinine 0.67      GFR Estimate >90      Calcium 9.4      Glucose 107 (*)    CBC WITH PLATELETS AND DIFFERENTIAL - Abnormal    WBC Count 11.1 (*)     RBC Count 5.27      Hemoglobin 14.2      Hematocrit 44.3      MCV 84      MCH 26.9      MCHC 32.1      RDW 13.2      Platelet Count 324      % Neutrophils 73      % Lymphocytes 18      % Monocytes 7      % Eosinophils 2      % Basophils 0      % Immature Granulocytes 1      NRBCs per 100 WBC 0      Absolute Neutrophils 8.1      Absolute Lymphocytes 2.0      Absolute Monocytes 0.7      Absolute Eosinophils 0.2      Absolute Basophils 0.0      Absolute Immature Granulocytes 0.1      Absolute NRBCs 0.0         RADIOLOGY:  No orders to display       PROCEDURES:   Procedures       I, Cirilo Lee, am serving as a scribe to document services personally performed by Dr. Jeffry Minaya  based on my observation and the provider's statements to me. I, Jeffry Minaya MD attest that Cirilo Lee is acting in a scribe capacity, has observed my performance of the services and has documented them in accordance with my direction.      Jeffry Minaya M.D.  Emergency Medicine  [facility] Emergency Department       Jeffry Minaya MD  06/30/25 9130

## 2025-06-30 NOTE — ED TRIAGE NOTES
PT reports feeling a migraine while helping a friend move. Migraine started at 2200. Pain 10/10. Excedrin taken and the pain is now 3/10. Pt does report nausea and is unable to drink.

## 2025-07-02 ENCOUNTER — VIRTUAL VISIT (OUTPATIENT)
Dept: FAMILY MEDICINE | Facility: CLINIC | Age: 25
End: 2025-07-02
Payer: COMMERCIAL

## 2025-07-02 DIAGNOSIS — Z02.89 ENCOUNTER FOR COMPLETION OF FORM WITH PATIENT: ICD-10-CM

## 2025-07-02 DIAGNOSIS — R53.82 CHRONIC FATIGUE: Primary | ICD-10-CM

## 2025-07-02 ASSESSMENT — PATIENT HEALTH QUESTIONNAIRE - PHQ9
10. IF YOU CHECKED OFF ANY PROBLEMS, HOW DIFFICULT HAVE THESE PROBLEMS MADE IT FOR YOU TO DO YOUR WORK, TAKE CARE OF THINGS AT HOME, OR GET ALONG WITH OTHER PEOPLE: VERY DIFFICULT
SUM OF ALL RESPONSES TO PHQ QUESTIONS 1-9: 15
SUM OF ALL RESPONSES TO PHQ QUESTIONS 1-9: 15

## 2025-07-02 NOTE — PROGRESS NOTES
Ellis is a 25 year old who is being evaluated via a billable video visit.    How would you like to obtain your AVS? MyChart  If the video visit is dropped, the invitation should be resent by:   Will anyone else be joining your video visit? No  {If patient encounters technical issues they should call [phone] :[id number]}    Assessment & Plan     Chronic fatigue  Encounter for completion of form with patient  Form completed today. Pt feels able to return to work this upcoming Monday. Form plus letter will be faxed to employer.     Depression Screening Follow Up        7/2/2025     3:55 PM   PHQ   PHQ-9 Total Score 15    Q9: Thoughts of better off dead/self-harm past 2 weeks Several days   F/U: Thoughts of suicide or self-harm Yes   F/U: Self harm-plan No   F/U: Self-harm action No   F/U: Safety concerns No       Patient-reported             Follow Up Actions Taken  Referred patient back to mental health provider    Follow-up  Return for Follow up with PCP, already scheduled.    Social determinants affecting health status and the provision of healthcare: Member of transgender community      Subjective   Ellis is a 25 year old, presenting for the following health issues:  OTHER (Get a letter to return back to work)        7/2/2025     4:01 PM   Additional Questions   Roomed by Pa   Accompanied by Self         7/2/2025   Declines Weight   Did patient decline having their weight taken? Yes         7/2/2025    Information    services provided? No     HPI    {ALERT  Recent PHQ-9/EPDS score indicates suicidal ideations, document follow-up within the A/P section of the note :[id number]}{Provider Documentation  Link to [facility] ([facility]) Flowsheet :[id number]}        Objective         Was out from work for chronic fatigue, POTS, to try to have a chance to return. Was not getting in enough nutrition, too exhausted to function. Went on leave June 18.     Now, things aren't great, but needs to go back  due to financial needs. They work in a library setting, supervising student workers, customer service, processing library materials.     Noticed a growth on inside of left inner labia. Maybe quarter sized. Paying someone out of pocket for a couple days of caregiving.    Mental health is worse than usual. Having suicidal thoughts, no plan. Feels like would only act on suicidal thoughts if all affairs were in order, and they're not, so risk is low. Has a therapist.     Vitals:  No vitals were obtained today due to virtual visit.    Physical Exam   GENERAL: alert and no distress  EYES: Eyes grossly normal to inspection.  No discharge or erythema, or obvious scleral/conjunctival abnormalities.  RESP: No audible wheeze, cough, or visible cyanosis.    SKIN: Visible skin clear. No significant rash, abnormal pigmentation or lesions.  NEURO: Cranial nerves grossly intact.  Mentation and speech appropriate for age.  PSYCH: Appropriate affect, tone, and pace of words        Video-Visit Details    Type of service:  Video Visit   Originating Location (pt. Location): Home  {PROVIDER LOCATION On-site should be selected for visits conducted from your clinic location or adjoining [facility] hospital, academic office, or other nearby [facility] building. Off-site should be selected for all other provider locations, including home:[id number]}  Distant Location (provider location):  On-site  Platform used for Video Visit: Marisela  Signed Electronically by: Sharri Garcia MD  {Email feedback regarding this note to [email or web address]   :[id number]}  Answers submitted by the patient for this visit:  Patient Health Questionnaire (Submitted on 7/2/2025)  If you checked off any problems, how difficult have these problems made it for you to do your work, take care of things at home, or get along with other people?: Very difficult  PHQ9 TOTAL SCORE: 15

## 2025-07-02 NOTE — LETTER
July 2, 2025      Ellis Hull  [address]  [place] [id number]        To Whom it May Concern,    I am one of Ellis Hull's medical providers. They may return to work without restrictions on Monday July 7, 2025.       Sincerely,          Sharri Garcia MD

## 2025-07-02 NOTE — PATIENT INSTRUCTIONS
Form completed today and will be faxed to your employer at the number you provided. Please follow-up as scheduled with Dr. Aguilar, including to show her the growth you are concerned about on your labia.

## 2025-07-03 ENCOUNTER — DOCUMENTATION ONLY (OUTPATIENT)
Dept: FAMILY MEDICINE | Facility: CLINIC | Age: 25
End: 2025-07-03
Payer: COMMERCIAL

## 2025-07-03 NOTE — PROGRESS NOTES
Form has been completed by provider.     Form sent out via: Fax to  at Fax Number: [phone]  Patient informed: No, Reason:  Output date: July 3, 2025    Deepali Preciado CMA      **Please close the encounter**

## 2025-07-30 ENCOUNTER — OFFICE VISIT (OUTPATIENT)
Dept: FAMILY MEDICINE | Facility: CLINIC | Age: 25
End: 2025-07-30
Payer: COMMERCIAL

## 2025-07-30 VITALS
OXYGEN SATURATION: 97 % | HEART RATE: 102 BPM | RESPIRATION RATE: 20 BRPM | HEIGHT: 65 IN | BODY MASS INDEX: 39.94 KG/M2 | DIASTOLIC BLOOD PRESSURE: 84 MMHG | WEIGHT: 239.7 LBS | SYSTOLIC BLOOD PRESSURE: 127 MMHG | TEMPERATURE: 98 F

## 2025-07-30 DIAGNOSIS — G93.32 MYALGIC ENCEPHALOMYELITIS/CHRONIC FATIGUE SYNDROME (ME/CFS): Primary | ICD-10-CM

## 2025-07-30 DIAGNOSIS — Z78.9 IMPAIRED MOBILITY AND ADLS: ICD-10-CM

## 2025-07-30 DIAGNOSIS — R63.4 WEIGHT LOSS: ICD-10-CM

## 2025-07-30 DIAGNOSIS — Z91.89 AT RISK FOR MALNUTRITION: ICD-10-CM

## 2025-07-30 DIAGNOSIS — Z74.09 IMPAIRED MOBILITY AND ADLS: ICD-10-CM

## 2025-07-30 DIAGNOSIS — F64.9 GENDER DYSPHORIA: ICD-10-CM

## 2025-07-30 DIAGNOSIS — N92.6 IRREGULAR UTERINE BLEEDING: ICD-10-CM

## 2025-07-30 DIAGNOSIS — R00.0 TACHYCARDIA: ICD-10-CM

## 2025-07-30 DIAGNOSIS — L02.92 RECURRENT BOILS: ICD-10-CM

## 2025-07-30 ASSESSMENT — PATIENT HEALTH QUESTIONNAIRE - PHQ9
SUM OF ALL RESPONSES TO PHQ QUESTIONS 1-9: 18
SUM OF ALL RESPONSES TO PHQ QUESTIONS 1-9: 18
10. IF YOU CHECKED OFF ANY PROBLEMS, HOW DIFFICULT HAVE THESE PROBLEMS MADE IT FOR YOU TO DO YOUR WORK, TAKE CARE OF THINGS AT HOME, OR GET ALONG WITH OTHER PEOPLE: VERY DIFFICULT

## 2025-08-04 ENCOUNTER — MEDICAL CORRESPONDENCE (OUTPATIENT)
Dept: HEALTH INFORMATION MANAGEMENT | Facility: CLINIC | Age: 25
End: 2025-08-04
Payer: COMMERCIAL

## 2025-08-21 DIAGNOSIS — F64.9 GENDER DYSPHORIA: ICD-10-CM

## 2025-08-22 RX ORDER — FINASTERIDE 1 MG/1
1 TABLET, FILM COATED ORAL DAILY
OUTPATIENT
Start: 2025-08-22

## (undated) DEVICE — BLADE KNIFE SURG 15 371115

## (undated) DEVICE — NDL EPIDURAL TUOHY 20GA 3.5" 405028

## (undated) DEVICE — CLOSURE SYS SKIN PREMIERPRO EXOFINFUSION 4X60CM 3473

## (undated) DEVICE — PEN MARKING SKIN W/PAPER RULER 31145785

## (undated) DEVICE — ESU ELEC BLADE 2.75" COATED/INSULATED E1455

## (undated) DEVICE — LINEN DRAPE 54X72" 5467

## (undated) DEVICE — DRAPE U SPLIT 74X120" 29440

## (undated) DEVICE — SU ETHILON 3-0 FS-1 18" 663G

## (undated) DEVICE — SU CHROMIC 2-0 SH 27" G123H

## (undated) DEVICE — SYR BULB IRRIG 50ML LATEX FREE 0035280

## (undated) DEVICE — DRSG DRAIN 2X2" 7087

## (undated) DEVICE — GLOVE PROTEXIS W/NEU-THERA 7.0  2D73TE70

## (undated) DEVICE — ESU PENCIL SMOKE EVAC W/ROCKER SWITCH 0703-047-000

## (undated) DEVICE — SPONGE LAP 18X18" X8435

## (undated) DEVICE — DRAIN JACKSON PRATT RESERVOIR 100ML SU130-1305

## (undated) DEVICE — LINEN TOWEL PACK X5 5464

## (undated) DEVICE — DECANTER BAG 2002S

## (undated) DEVICE — DRAIN JACKSON PRATT CHANNEL 15FR ROUND HUBLESS SIL JP-2228

## (undated) DEVICE — PREP CHLORAPREP 26ML TINTED ORANGE  260815

## (undated) DEVICE — SUCTION MANIFOLD NEPTUNE 2 SYS 1 PORT 702-025-000

## (undated) DEVICE — SU STRATAFIX PDS PLUS 1 CT-1 18" SXPP1A404

## (undated) DEVICE — PAD CHUX UNDERPAD 30X36" P3036C

## (undated) DEVICE — SU STRATAFIX MONOCRYL 3-0 SPIRAL PS-2 45CM SXMP1B107

## (undated) DEVICE — DRSG ABDOMINAL 07 1/2X8" 7197D

## (undated) DEVICE — SOL NACL 0.9% INJ 1000ML BAG 2B1324X

## (undated) DEVICE — ESU GROUND PAD ADULT W/CORD E7507

## (undated) DEVICE — SOL NACL 0.9% IRRIG 1000ML BOTTLE 2F7124

## (undated) DEVICE — DRSG KERLIX 4 1/2"X4YDS ROLL 6730

## (undated) DEVICE — PACK ENT MINOR CUSTOM ASC

## (undated) DEVICE — SUCTION TIP YANKAUER STR K87

## (undated) DEVICE — SYR 20ML LL W/O NDL 302830

## (undated) DEVICE — SU PLAIN FAST ABSORB 5-0 PC-1 18" 1915G

## (undated) DEVICE — BNDG ELASTIC 6" DBL LENGTH UNSTERILE 6611-16

## (undated) DEVICE — SU MONOCRYL 2-0 SH 27" UND Y417H

## (undated) RX ORDER — FENTANYL CITRATE 50 UG/ML
INJECTION, SOLUTION INTRAMUSCULAR; INTRAVENOUS
Status: DISPENSED
Start: 2021-02-16

## (undated) RX ORDER — DEXAMETHASONE SODIUM PHOSPHATE 4 MG/ML
INJECTION, SOLUTION INTRA-ARTICULAR; INTRALESIONAL; INTRAMUSCULAR; INTRAVENOUS; SOFT TISSUE
Status: DISPENSED
Start: 2021-02-16

## (undated) RX ORDER — ACETAMINOPHEN 325 MG/1
TABLET ORAL
Status: DISPENSED
Start: 2021-02-16

## (undated) RX ORDER — PROPOFOL 10 MG/ML
INJECTION, EMULSION INTRAVENOUS
Status: DISPENSED
Start: 2021-02-16

## (undated) RX ORDER — LIDOCAINE HYDROCHLORIDE 20 MG/ML
INJECTION, SOLUTION EPIDURAL; INFILTRATION; INTRACAUDAL; PERINEURAL
Status: DISPENSED
Start: 2021-02-16

## (undated) RX ORDER — OXYCODONE HYDROCHLORIDE 5 MG/1
TABLET ORAL
Status: DISPENSED
Start: 2021-02-16

## (undated) RX ORDER — CLINDAMYCIN PHOSPHATE 900 MG/50ML
INJECTION, SOLUTION INTRAVENOUS
Status: DISPENSED
Start: 2021-02-16

## (undated) RX ORDER — GABAPENTIN 300 MG/1
CAPSULE ORAL
Status: DISPENSED
Start: 2021-02-16

## (undated) RX ORDER — ONDANSETRON 2 MG/ML
INJECTION INTRAMUSCULAR; INTRAVENOUS
Status: DISPENSED
Start: 2021-02-16

## (undated) RX ORDER — EPINEPHRINE 1 MG/ML
INJECTION, SOLUTION INTRAMUSCULAR; SUBCUTANEOUS
Status: DISPENSED
Start: 2021-02-16

## (undated) RX ORDER — GLYCOPYRROLATE 0.2 MG/ML
INJECTION INTRAMUSCULAR; INTRAVENOUS
Status: DISPENSED
Start: 2021-02-16